# Patient Record
Sex: FEMALE | Race: OTHER | Employment: FULL TIME | URBAN - METROPOLITAN AREA
[De-identification: names, ages, dates, MRNs, and addresses within clinical notes are randomized per-mention and may not be internally consistent; named-entity substitution may affect disease eponyms.]

---

## 2023-01-06 ENCOUNTER — TELEPHONE (OUTPATIENT)
Dept: FAMILY MEDICINE CLINIC | Age: 28
End: 2023-01-06

## 2023-01-06 NOTE — TELEPHONE ENCOUNTER
Patient called stating that she needed a referral to be put in to the infusion center at [address] [place] [address]. She stated that she is currently going through a flare up and it is getting worse. The fax number to the facility is [phone]. Thanks!

## 2023-01-07 ENCOUNTER — HOSPITAL ENCOUNTER (EMERGENCY)
Age: 28
Discharge: HOME | End: 2023-01-07
Attending: EMERGENCY MEDICINE
Payer: COMMERCIAL

## 2023-01-07 ENCOUNTER — APPOINTMENT (OUTPATIENT)
Dept: CT IMAGING | Age: 28
End: 2023-01-07
Attending: NURSE PRACTITIONER
Payer: COMMERCIAL

## 2023-01-07 VITALS
HEIGHT: 63 IN | BODY MASS INDEX: 23.21 KG/M2 | RESPIRATION RATE: 16 BRPM | TEMPERATURE: 98.6 F | OXYGEN SATURATION: 98 % | HEART RATE: 75 BPM | WEIGHT: 131 LBS | SYSTOLIC BLOOD PRESSURE: 108 MMHG | DIASTOLIC BLOOD PRESSURE: 63 MMHG

## 2023-01-07 DIAGNOSIS — G35 MS (MULTIPLE SCLEROSIS) (HCC): Primary | ICD-10-CM

## 2023-01-07 LAB
ALBUMIN SERPL-MCNC: 4 G/DL (ref 3.5–5)
ALBUMIN/GLOB SERPL: 1.3 (ref 1.1–2.2)
ALP SERPL-CCNC: 64 U/L (ref 45–117)
ALT SERPL-CCNC: 21 U/L (ref 12–78)
ANION GAP SERPL CALC-SCNC: 6 MMOL/L (ref 5–15)
APPEARANCE UR: CLEAR
AST SERPL-CCNC: 13 U/L (ref 15–37)
BASOPHILS # BLD: 0 K/UL (ref 0–0.1)
BASOPHILS NFR BLD: 1 % (ref 0–1)
BILIRUB SERPL-MCNC: 0.3 MG/DL (ref 0.2–1)
BILIRUB UR QL: NEGATIVE
BUN SERPL-MCNC: 17 MG/DL (ref 6–20)
BUN/CREAT SERPL: 20 (ref 12–20)
CALCIUM SERPL-MCNC: 9.2 MG/DL (ref 8.5–10.1)
CHLORIDE SERPL-SCNC: 109 MMOL/L (ref 97–108)
CO2 SERPL-SCNC: 26 MMOL/L (ref 21–32)
COLOR UR: NORMAL
COMMENT, HOLDF: NORMAL
CREAT SERPL-MCNC: 0.86 MG/DL (ref 0.55–1.02)
DIFFERENTIAL METHOD BLD: NORMAL
EOSINOPHIL # BLD: 0.1 K/UL (ref 0–0.4)
EOSINOPHIL NFR BLD: 1 % (ref 0–7)
ERYTHROCYTE [DISTWIDTH] IN BLOOD BY AUTOMATED COUNT: 11.9 % (ref 11.5–14.5)
GLOBULIN SER CALC-MCNC: 3.2 G/DL (ref 2–4)
GLUCOSE SERPL-MCNC: 87 MG/DL (ref 65–100)
GLUCOSE UR STRIP.AUTO-MCNC: NEGATIVE MG/DL
HCG UR QL: NEGATIVE
HCT VFR BLD AUTO: 39.6 % (ref 35–47)
HGB BLD-MCNC: 13.8 G/DL (ref 11.5–16)
HGB UR QL STRIP: NEGATIVE
IMM GRANULOCYTES # BLD AUTO: 0 K/UL (ref 0–0.04)
IMM GRANULOCYTES NFR BLD AUTO: 0 % (ref 0–0.5)
KETONES UR QL STRIP.AUTO: NEGATIVE MG/DL
LEUKOCYTE ESTERASE UR QL STRIP.AUTO: NEGATIVE
LYMPHOCYTES # BLD: 1.6 K/UL (ref 0.8–3.5)
LYMPHOCYTES NFR BLD: 18 % (ref 12–49)
MCH RBC QN AUTO: 31.7 PG (ref 26–34)
MCHC RBC AUTO-ENTMCNC: 34.8 G/DL (ref 30–36.5)
MCV RBC AUTO: 90.8 FL (ref 80–99)
MONOCYTES # BLD: 0.6 K/UL (ref 0–1)
MONOCYTES NFR BLD: 7 % (ref 5–13)
NEUTS SEG # BLD: 6.5 K/UL (ref 1.8–8)
NEUTS SEG NFR BLD: 73 % (ref 32–75)
NITRITE UR QL STRIP.AUTO: NEGATIVE
NRBC # BLD: 0 K/UL (ref 0–0.01)
NRBC BLD-RTO: 0 PER 100 WBC
PH UR STRIP: 6 (ref 5–8)
PLATELET # BLD AUTO: 264 K/UL (ref 150–400)
PMV BLD AUTO: 11.1 FL (ref 8.9–12.9)
POTASSIUM SERPL-SCNC: 3.7 MMOL/L (ref 3.5–5.1)
PROT SERPL-MCNC: 7.2 G/DL (ref 6.4–8.2)
PROT UR STRIP-MCNC: NEGATIVE MG/DL
RBC # BLD AUTO: 4.36 M/UL (ref 3.8–5.2)
SAMPLES BEING HELD,HOLD: NORMAL
SODIUM SERPL-SCNC: 141 MMOL/L (ref 136–145)
SP GR UR REFRACTOMETRY: 1.02 (ref 1–1.03)
UR CULT HOLD, URHOLD: NORMAL
UROBILINOGEN UR QL STRIP.AUTO: 0.2 EU/DL (ref 0.2–1)
WBC # BLD AUTO: 8.9 K/UL (ref 3.6–11)

## 2023-01-07 PROCEDURE — 96374 THER/PROPH/DIAG INJ IV PUSH: CPT | Performed by: NURSE PRACTITIONER

## 2023-01-07 PROCEDURE — 74011000258 HC RX REV CODE- 258: Performed by: NURSE PRACTITIONER

## 2023-01-07 PROCEDURE — 81025 URINE PREGNANCY TEST: CPT

## 2023-01-07 PROCEDURE — 85025 COMPLETE CBC W/AUTO DIFF WBC: CPT

## 2023-01-07 PROCEDURE — 81003 URINALYSIS AUTO W/O SCOPE: CPT

## 2023-01-07 PROCEDURE — 80053 COMPREHEN METABOLIC PANEL: CPT

## 2023-01-07 PROCEDURE — 74011250636 HC RX REV CODE- 250/636: Performed by: NURSE PRACTITIONER

## 2023-01-07 PROCEDURE — 99284 EMERGENCY DEPT VISIT MOD MDM: CPT | Performed by: NURSE PRACTITIONER

## 2023-01-07 PROCEDURE — 36415 COLL VENOUS BLD VENIPUNCTURE: CPT

## 2023-01-07 RX ORDER — METHYLPREDNISOLONE 32 MG/1
1000 TABLET ORAL DAILY
Qty: 221 TABLET | Refills: 0 | Status: SHIPPED | OUTPATIENT
Start: 2023-01-07 | End: 2023-01-14

## 2023-01-07 RX ADMIN — METHYLPREDNISOLONE SODIUM SUCCINATE 1000 MG: 1 INJECTION, POWDER, LYOPHILIZED, FOR SOLUTION INTRAMUSCULAR; INTRAVENOUS at 15:01

## 2023-01-07 NOTE — ED PROVIDER NOTES
This is a 26-year-old female who presents to the emergency room with complaints of an MS flare. Patient was diagnosed with MS in 2012 in [facility].  Patient recently moved to the [facility] and has not seen her neurologist since she has been here. Also states she has been experiencing a headache on the left side along with heaviness in her left lower extremity and now to her left upper extremity. Also adds that she recently ran out of her prednisone and as soon as she stopped taking her medicine her symptoms started to increase. Denies any chest pain, shortness of breath, dizziness, nausea or vomiting, fevers or chills. No speech difficulty, no visual changes. States \"it feels like I do not have a left side. \"  There are no further complaints at this time    Shalini Ferrer MD  Past Medical History:  2012: MS (multiple sclerosis) ([facility]      Comment:  Has been following a neurologist in [facility] (Dr. Wyline Shone); takes kesimpta regularly and steroids                during a flare up  No past surgical history on file. Past Medical History:   Diagnosis Date    MS (multiple sclerosis) ([facility] 2012    Has been following a neurologist in [facility] (Dr. Wyline Shone); takes kesimpta regularly and steroids during a flare up       No past surgical history on file.       Family History:   Problem Relation Age of Onset    No Known Problems Mother     No Known Problems Father        Social History     Socioeconomic History    Marital status:      Spouse name: Not on file    Number of children: Not on file    Years of education: Not on file    Highest education level: Not on file   Occupational History    Not on file   Tobacco Use    Smoking status: Never     Passive exposure: Never    Smokeless tobacco: Never   Vaping Use    Vaping Use: Never used   Substance and Sexual Activity    Alcohol use: Not Currently    Drug use: Never    Sexual activity: Yes     Partners: Male     Comment:    Other Topics Concern    Not on file   Social History Narrative    Not on file     Social Determinants of Health     Financial Resource Strain: Low Risk     Difficulty of Paying Living Expenses: Not hard at all   Food Insecurity: No Food Insecurity    Worried About Running Out of Food in the Last Year: Never true    Ran Out of Food in the Last Year: Never true   Transportation Needs: Not on file   Physical Activity: Not on file   Stress: Not on file   Social Connections: Not on file   Intimate Partner Violence: Not on file   Housing Stability: Not on file         ALLERGIES: Patient has no known allergies. Review of Systems   Constitutional:  Negative for appetite change, chills, diaphoresis, fatigue and fever. HENT:  Negative for congestion, ear discharge, ear pain, sinus pressure, sinus pain, sore throat and trouble swallowing. Eyes:  Negative for photophobia, pain, redness and visual disturbance. Respiratory:  Negative for chest tightness, shortness of breath and wheezing. Cardiovascular:  Negative for chest pain and palpitations. Gastrointestinal:  Negative for abdominal distention, abdominal pain, nausea and vomiting. Endocrine: Negative. Genitourinary:  Negative for difficulty urinating, flank pain, frequency and urgency. Musculoskeletal:  Negative for back pain, neck pain and neck stiffness. Skin:  Negative for color change, pallor, rash and wound. Allergic/Immunologic: Negative. Neurological:  Positive for weakness and numbness. Negative for dizziness, speech difficulty and headaches. Hematological:  Does not bruise/bleed easily. Psychiatric/Behavioral:  Negative for behavioral problems. The patient is not nervous/anxious. Vitals:    01/07/23 1327   BP: 108/63   Pulse: 75   Resp: 16   Temp: 98.6 °F (37 °C)   SpO2: 98%   Weight: 59.4 kg (131 lb)   Height: 5' 3\" (1.6 m)            Physical Exam  Vitals and nursing note reviewed.    Constitutional:       General: She is not in acute distress. Appearance: Normal appearance. She is well-developed. She is not ill-appearing. HENT:      Head: Normocephalic and atraumatic. Right Ear: External ear normal.      Left Ear: External ear normal.      Nose: Nose normal. No congestion. Mouth/Throat:      Mouth: Mucous membranes are moist.   Eyes:      General:         Right eye: No discharge. Left eye: No discharge. Conjunctiva/sclera: Conjunctivae normal.      Pupils: Pupils are equal, round, and reactive to light. Neck:      Vascular: No JVD. Trachea: No tracheal deviation. Cardiovascular:      Rate and Rhythm: Normal rate and regular rhythm. Pulses: Normal pulses. Heart sounds: Normal heart sounds. No murmur heard. No gallop. Pulmonary:      Effort: Pulmonary effort is normal. No respiratory distress. Breath sounds: Normal breath sounds. Chest:      Chest wall: No tenderness. Abdominal:      General: Bowel sounds are normal. There is no distension. Palpations: Abdomen is soft. Tenderness: There is no abdominal tenderness. There is no guarding or rebound. Genitourinary:     Comments: Negative    Musculoskeletal:         General: No tenderness. Normal range of motion. Cervical back: Normal range of motion and neck supple. No tenderness. Skin:     General: Skin is warm and dry. Capillary Refill: Capillary refill takes less than 2 seconds. Coloration: Skin is not pale. Findings: No erythema or rash. Neurological:      Mental Status: She is alert and oriented to person, place, and time. Motor: Weakness present. Coordination: Coordination normal.   Psychiatric:         Mood and Affect: Mood normal.         Behavior: Behavior normal.         Thought Content: Thought content normal.         Judgment: Judgment normal.        Medical Decision Making  Amount and/or Complexity of Data Reviewed  Labs: ordered.   Radiology: ordered. Risk  Prescription drug management. Labs Reviewed   METABOLIC PANEL, COMPREHENSIVE - Abnormal; Notable for the following components:       Result Value    Chloride 109 (*)     AST (SGOT) 13 (*)     All other components within normal limits   URINE CULTURE HOLD SAMPLE   CBC WITH AUTOMATED DIFF   URINALYSIS W/ RFLX MICROSCOPIC   SAMPLES BEING HELD   HCG URINE, QL. - POC       No results found. 4:22 PM  Pt has been reexamined. Offer admission to the hospital of which she declined. Spoke with pharmacy regarding outpatient dosing and verified 1000 mg methylprednisolone po daily times 7 days. Pt has no new complaints, changes or physical findings. Care plan outlined and precautions discussed. All available results were reviewed with pt. All medications were reviewed with pt. All of pt's questions and concerns were addressed. Pt agrees to F/U as instructed and agrees to return to ED upon further deterioration. Pt is ready to go home. Yony Dempsey NP    Please note that this dictation was completed with Wanderio, the computer voice recognition software. Quite often unanticipated grammatical, syntax, homophones, and other interpretive errors are inadvertently transcribed by the computer software. Please disregard these errors. Please excuse any errors that have escaped final proofreading. Thank you.     Procedures these errors. Please excuse any errors that have escaped final proofreading. Thank you.     Procedures

## 2023-01-07 NOTE — ED TRIAGE NOTES
Pt presents to the ED with c/o MS relapse. Pt states that she has been experiencing a headache and heaviness on the L side, specifically her L arm. Pt also reports tingling in the L leg and weakness. Pt states that she recently ran out of her Prednisone and that's when her symptoms started to increase. Pt was dx with MS in 2012.  Pt states that she has not seen a neurologist in a while due to her recently moving from [facility].

## 2023-01-24 ENCOUNTER — OFFICE VISIT (OUTPATIENT)
Dept: FAMILY MEDICINE CLINIC | Age: 28
End: 2023-01-24
Payer: COMMERCIAL

## 2023-01-24 VITALS
OXYGEN SATURATION: 99 % | BODY MASS INDEX: 23.4 KG/M2 | SYSTOLIC BLOOD PRESSURE: 94 MMHG | HEART RATE: 74 BPM | DIASTOLIC BLOOD PRESSURE: 54 MMHG | WEIGHT: 132.13 LBS

## 2023-01-24 DIAGNOSIS — Z12.4 SCREENING FOR CERVICAL CANCER: Primary | ICD-10-CM

## 2023-01-24 RX ORDER — PREDNISONE 20 MG/1
TABLET ORAL
COMMUNITY

## 2023-01-24 NOTE — PROGRESS NOTES
[address] [place] [address]   Office [phone], Fax [phone]      Chief Complaint:     Chief Complaint   Patient presents with    Well Woman     pap       Stanford Taylor is a 32 y.o. female that presents for: Pap      Subjective:   HPI:  Stanford Taylor is a 32 y.o. female that presents for:    Worried about getting PAP smear done as it is patient's first time. Patient recently got , and wanted to get a pap smear done. She has a diagnosis of MS from the age of 16. Has a neurologist appointment coming up in March. Currently has no concerns. Past medical history, social history, medications, and allergies personally reviewed. Past Medical History:   Diagnosis Date    MS (multiple sclerosis) ([facility] 2012    Has been following a neurologist in [facility] (Dr. Lara Alcantar); takes kesimpta regularly and steroids during a flare up        Social Hx:   Alcohol: No  Tobacco: No  Illicit drug use: No     Medications:   Current Outpatient Medications   Medication Sig    ofatumumab (KESIMPTA PEN SC) by SubCUTAneous route. predniSONE (DELTASONE) 20 mg tablet Take  by mouth daily (with breakfast). Start 3pills a day for 3 days,2nd day 2 pills for 2 days,1 pill for 1 day     No current facility-administered medications for this visit. Allergies:  No Known Allergies     Health Maintenance:  Health Maintenance Due   Topic Date Due    Hepatitis C Screening  Never done    COVID-19 Vaccine (1) Never done    DTaP/Tdap/Td series (1 - Tdap) Never done    Pap Smear  Never done    Flu Vaccine (1) Never done        Objective:   Vitals reviewed. Visit Vitals  BP (!) 94/54 (BP 1 Location: Right arm, BP Patient Position: Sitting, BP Cuff Size: Adult)   Pulse 74   Wt 132 lb 2 oz (59.9 kg)   LMP 01/02/2023   SpO2 99%   BMI 23.40 kg/m²        Physical Exam:  General Alert. No distress. Not diaphoretic. Cardio Normal rate, regular rhythm.     Pulmonary Effort normal. Breath sounds normal. No respiratory distress. No wheezes, rales, or rhonchi. No Stridor. Extremities No edema of lower extremities. No tenderness. Neurological No focal deficits. Skin Skin is warm and dry. No rash noted. No erythema. Psychiatric Mood, affect, and judgment normal.        Assessment/Plan:   Nettie Jameson is a 32 y.o. with a PMHx of MS who presents for pap smear    1. Screening for cervical cancer: Pap collected. -     PAP IG, APTIMA HPV AND RFX 16/18,45 ([id number]); Future   - Follow up in 1 year for annual physical    2. Multiple Sclerosis: Diagnosed at the age of 16. Used to have a neurologist in [facility]. Will establish with a neurologist, Dr. Rosemary Bella in March. - on Ofatumumab injections once monthly   - Prednisone for flares   - Follow up with Dr. Rosemary Bella in 2 months (neurology)      Follow up:   Return in about 1 year (around 1/24/2024) for Annual physical.    Pt was discussed with Dr Kannan Bonner (attending physician). I have reviewed pertinent labs results and other data. I have discussed the diagnosis with the patient and the intended plan as seen in the above orders. The patient has received an after-visit summary and questions were answered concerning future plans. I have discussed medication side effects and warnings with the patient as well.     Stewart Wolf MD  Family Medicine Resident PGY-1

## 2023-01-24 NOTE — PROGRESS NOTES
Chief Complaint   Patient presents with    Well Woman     pap     Visit Vitals  BP (!) 94/54 (BP 1 Location: Right arm, BP Patient Position: Sitting, BP Cuff Size: Adult)   Pulse 74   Wt 132 lb 2 oz (59.9 kg)   SpO2 99%   BMI 23.40 kg/m²

## 2023-01-28 LAB
CYTOLOGIST CVX/VAG CYTO: NORMAL
CYTOLOGY CVX/VAG DOC CYTO: NORMAL
CYTOLOGY CVX/VAG DOC THIN PREP: NORMAL
DX ICD CODE: NORMAL
HPV GENOTYPE REFLEX: NORMAL
HPV I/H RISK 4 DNA CVX QL PROBE+SIG AMP: NEGATIVE
Lab: NORMAL
OTHER STN SPEC: NORMAL
STAT OF ADQ CVX/VAG CYTO-IMP: NORMAL

## 2023-03-23 ENCOUNTER — TELEPHONE (OUTPATIENT)
Dept: NEUROLOGY | Age: 28
End: 2023-03-23

## 2023-03-23 NOTE — TELEPHONE ENCOUNTER
Pt came into office at 2:15pm to be checked in for 2pm appt with [place]. I told pt her appt was at 2pm and she said yea? Then I said it's 2:17pm. She responded with yea? I told her we have a late policy and we have to reschedule. Pt told me she was here at 2:08pm and I knew she was lying because she had just walked in the door.  Pt had no paperwork so I gave it to her and gave her appt for tomorrow at 11:30am. Told her to be here at 11am. Pt was irritated, but said she understands

## 2023-03-24 ENCOUNTER — OFFICE VISIT (OUTPATIENT)
Dept: NEUROLOGY | Age: 28
End: 2023-03-24

## 2023-03-24 VITALS
WEIGHT: 134 LBS | HEIGHT: 63 IN | BODY MASS INDEX: 23.74 KG/M2 | RESPIRATION RATE: 14 BRPM | SYSTOLIC BLOOD PRESSURE: 115 MMHG | HEART RATE: 87 BPM | OXYGEN SATURATION: 100 % | DIASTOLIC BLOOD PRESSURE: 67 MMHG

## 2023-03-24 DIAGNOSIS — G35 MS (MULTIPLE SCLEROSIS) (HCC): Primary | ICD-10-CM

## 2023-03-24 NOTE — PROGRESS NOTES
[facility] Neurology Clinics and [address] at [facility] Neurology Clinics at [facility] [address], [address] [facility] [address] [facility] [address]  [phone] Office  [phone]           Referring: Nhung Mendoza, [address],  Lorelei Ryan 33     Chief Complaint   Patient presents with    New Patient    Multiple Sclerosis     Dx'd 2012 in [facility].  Last MRIs were about 1 yr ago, previous tx Mayzent, Gilenya, Copaxone, currently on Sidumula 30     26-year-old lady presents for neurologic consultation regarding multiple sclerosis. She is accompanied by her . She relates having symptoms of left-sided numbness that would increase and not go away and she went to the hospital in 2012 with the symptoms where she underwent work-up including multiple MRIs lumbar puncture etc. and was diagnosed with multiple sclerosis. She was started on Copaxone and was on that for 2 years but was changed after she had multiple relapses. She went on Gilenya and was on that for 4 years and came off when she moved to the [address] [place] [address]. She was on Mayzent for 2 years noting frequent relapses and she has been on Kcentra most recently. She notes that she has had 2 or 3 relapses and she describes all of these as really left-sided numbness with some headache. She was following with her neurologist in [facility] and she last saw him this past February and MRIs were planned to be done but she was in [facility] for her wedding and the next day was her wedding so she did not get those done and then she came back to the [facility]. She continues to have left-sided numbness. She and her  would like to have a baby in the next 3 years or so. She does believe her symptoms of left-sided numbness are getting a bit worse and she believes she has some decreased dexterity of the left hand.     Office visit with Dr. Dia Lino dated January 24, 2023 where patient came in for routine Pap smear. She was said to have been diagnosed at the age of 16 with multiple sclerosis and was followed by neurology in [place].  She was on key symptom and prednisone for flares. This of MRI ordered by Dr. Wes Bolden dated February 24, 2022 with reported out innumerable 40-50 lesions in confluency. No new lesions. And this was said to be stable to improved versus MRI November 4, 2021. There was a T2 hyperintense lesion partially visualized in the cervical cord    MRI cervical spine November 15, 2021 with subtle T2 and STIR hyperintense lesion at the level of the foramen magnum 4 mm with some enhancement. Office visit March 3, 2022  Patient was said to have been diagnosed with relapsing remitting MS since 2012 . She was tolerating that. She was continued on keisempta    Laboratory analysis February 1, 1757 metabolic panel unremarkable  Immunoglobin's unremarkable  RPR nonreactive  hCG negative  Hepatitis B surface antigen nonreactive  Vitamin D 49  B12 and folate normal  HIV nonreactive  Hepatitis C nonreactive  Hepatitis core antibody nonreactive  Varicella-zoster IgG positive at 3  Hepatitis B surface antibody nonreactive    Office visit February 9, 2022 where she was on [place] but had not injected since December. They considered switching to [facility] visit December 21, 2021 where patient had symptoms of relapse earlier in the month and had 3 days of Solu-Medrol. No examination    Office note from December 16, 2021 where she was noted to have had a relapse in October Solu-Medrol 1500 mg symptoms did not resolve completely. She was complaining of paresthesia and myalgia of her left extremities. Examination found less sensation on the left side of her face versus the right otherwise unremarkable.   Past Medical History:   Diagnosis Date    MS (multiple sclerosis) ([facility] 2012    Has been following a neurologist in [place] ( Napoleon Mckeon); takes kesimpta regularly and steroids during a flare up       History reviewed. No pertinent surgical history. Current Outpatient Medications   Medication Sig Dispense Refill    ofatumumab 20 mg/0.4 mL pnij by SubCUTAneous route. predniSONE (DELTASONE) 20 mg tablet Take  by mouth daily (with breakfast). Start 3pills a day for 3 days,2nd day 2 pills for 2 days,1 pill for 1 day          No Known Allergies    Social History     Tobacco Use    Smoking status: Never     Passive exposure: Never    Smokeless tobacco: Never   Vaping Use    Vaping Use: Never used   Substance Use Topics    Alcohol use: Not Currently    Drug use: Never       Family History   Problem Relation Age of Onset    No Known Problems Mother     No Known Problems Father        Review of Systems  Pertinent positives and negatives as noted. Examination  Visit Vitals  Ht 5' 3\" (1.6 m)   Wt 60.8 kg (134 lb)   BMI 23.74 kg/m²     She is a pleasant lady. She is awake alert and oriented. She has normal speech and normal language. Cognition is normal.  Cranial nerves intact 2-12. No afferent pupillary defect. No pronation or drift. She has postural tremor of the outstretched hands. She resists fully in all muscle groups in the upper and lower extremities bilaterally. Reflexes more brisk right upper and lower versus left. No ataxia. Steady gait.     Impression/Plan  Multiple sclerosis as above  We discussed that the recurrent left-sided numbness would be a bit odd to describe as a relapse meaning the exact same symptoms returning and this is more likely just recruitments of her known deficit  She and her  have wished to start a family in the next few years and it would make sense to treat with [address] [place] [address] committing to 2 years of treatment and then she would be free to plan her family as she wishes  Restage disease with MRIs of the brain cervical and thoracic spine  Return after    Lorin Jeffery MD          This note was created using voice recognition software. Despite editing, there may be syntax errors.

## 2023-04-24 ENCOUNTER — HOSPITAL ENCOUNTER (OUTPATIENT)
Dept: MRI IMAGING | Age: 28
Discharge: HOME | End: 2023-04-24
Attending: PSYCHIATRY & NEUROLOGY
Payer: COMMERCIAL

## 2023-04-24 DIAGNOSIS — G35 MS (MULTIPLE SCLEROSIS) (HCC): ICD-10-CM

## 2023-04-24 PROCEDURE — 74011250636 HC RX REV CODE- 250/636: Performed by: PSYCHIATRY & NEUROLOGY

## 2023-04-24 PROCEDURE — 72156 MRI NECK SPINE W/O & W/DYE: CPT

## 2023-04-24 PROCEDURE — 72157 MRI CHEST SPINE W/O & W/DYE: CPT

## 2023-04-24 PROCEDURE — 70553 MRI BRAIN STEM W/O & W/DYE: CPT

## 2023-04-24 PROCEDURE — A9576 INJ PROHANCE MULTIPACK: HCPCS | Performed by: PSYCHIATRY & NEUROLOGY

## 2023-04-24 RX ADMIN — GADOTERIDOL 13 ML: 279.3 INJECTION, SOLUTION INTRAVENOUS at 20:21

## 2023-04-27 ENCOUNTER — OFFICE VISIT (OUTPATIENT)
Dept: NEUROLOGY | Age: 28
End: 2023-04-27
Payer: COMMERCIAL

## 2023-04-27 VITALS
SYSTOLIC BLOOD PRESSURE: 124 MMHG | HEART RATE: 78 BPM | RESPIRATION RATE: 20 BRPM | WEIGHT: 134 LBS | OXYGEN SATURATION: 100 % | DIASTOLIC BLOOD PRESSURE: 63 MMHG | HEIGHT: 63 IN | BODY MASS INDEX: 23.74 KG/M2

## 2023-04-27 DIAGNOSIS — G35 MS (MULTIPLE SCLEROSIS) (HCC): Primary | ICD-10-CM

## 2023-04-27 DIAGNOSIS — Z51.81 MEDICATION MONITORING ENCOUNTER: ICD-10-CM

## 2023-04-27 PROCEDURE — 99215 OFFICE O/P EST HI 40 MIN: CPT | Performed by: PSYCHIATRY & NEUROLOGY

## 2023-04-27 NOTE — PROGRESS NOTES
Visit Vitals  /63 (BP 1 Location: Left upper arm, BP Patient Position: Sitting, BP Cuff Size: Adult)   Pulse 78   Resp 20   Ht 5' 3\" (1.6 m)   Wt 134 lb (60.8 kg)   SpO2 100%   BMI 23.74 kg/m²      Chief Complaint   Patient presents with    Results     Patient is here for MRI results

## 2023-04-27 NOTE — PROGRESS NOTES
[facility] Neurology Clinics and [address] at [facility] Neurology Clinics at [address] [facility], [address] [place] [address] [address] [facility], [address] [place] [address]   [phone]              Chief Complaint   Patient presents with    Results     Patient is here for MRI results      Current Outpatient Medications   Medication Sig Dispense Refill    ofatumumab 20 mg/0.4 mL pnij by SubCUTAneous route. predniSONE (DELTASONE) 20 mg tablet Take  by mouth daily (with breakfast). Start 3pills a day for 3 days,2nd day 2 pills for 2 days,1 pill for 1 day        No Known Allergies  Social History     Tobacco Use    Smoking status: Never     Passive exposure: Never    Smokeless tobacco: Never   Vaping Use    Vaping Use: Never used   Substance Use Topics    Alcohol use: Not Currently    Drug use: Never     26-year-old lady returns today for follow-up. I saw her about a month ago for neurologic consultation regarding MS. Her symptoms of left-sided numbness started in 2012. Previous DMT  Copaxone-discontinued due to multiple relapses  Gilenya-discontinued when she moved to the [facility] x2 years-frequent relapses  Ayesha Levi most recently    MRI of the brain March 24, 2023 with moderate burden. No enhancing lesions  MRI of the cervical spine same date lesion in the cervical medullary junction  MRI of the thoracic cord same date no lesions    Films personally reviewed                                      Labs done in [facility] dated February 12, 2021  Comprehensive metabolic profile normal  Vitamin B12 normal  Hepatitis B surface antibody nonreactive  Hepatitis surface antigen nonreactive  Hepatitis C antibody nonreactive  Immunoglobin's normal  The urine culture unremarkable  TSH normal    She has no recent exacerbation. With her baseline numbness and tingling of the left side.       Examination  Visit Vitals  /63 (BP 1 Location: Left upper arm, BP Patient Position: Sitting, BP Cuff Size: Adult)   Pulse 78   Resp 20   Ht 5' 3\" (1.6 m)   Wt 60.8 kg (134 lb)   SpO2 100%   BMI 23.74 kg/m²     She is awake alert and oriented. Speech and language normal.  Cognition normal.  No ataxia. Impression/Plan  Multiple sclerosis  [facility] disease modifying therapy  Follow-up after her first treatment course    Ravin Gray MD        This note was created using voice recognition software. Despite editing, there may be syntax errors.

## 2023-05-04 LAB
GAMMA INTERFERON BACKGROUND BLD IA-ACNC: 0.15 IU/ML
M TB IFN-G BLD-IMP: NEGATIVE
M TB IFN-G CD4+ T-CELLS BLD-ACNC: 0.14 IU/ML
M TBIFN-G CD4+ CD8+T-CELLS BLD-ACNC: 0.22 IU/ML
MITOGEN IGNF BLD-ACNC: >10 IU/ML
SERVICE CMNT-IMP: NORMAL

## 2023-07-10 ENCOUNTER — TELEPHONE (OUTPATIENT)
Age: 28
End: 2023-07-10

## 2023-07-10 NOTE — TELEPHONE ENCOUNTER
Pt requesting call back to discuss medications and referral to [id number] [place] [address] [place] specialist. Please contact pt.

## 2023-08-24 ENCOUNTER — OFFICE VISIT (OUTPATIENT)
Age: 28
End: 2023-08-24
Payer: COMMERCIAL

## 2023-08-24 VITALS
DIASTOLIC BLOOD PRESSURE: 60 MMHG | HEART RATE: 64 BPM | RESPIRATION RATE: 14 BRPM | OXYGEN SATURATION: 96 % | SYSTOLIC BLOOD PRESSURE: 111 MMHG

## 2023-08-24 DIAGNOSIS — G35 MS (MULTIPLE SCLEROSIS) (HCC): Primary | ICD-10-CM

## 2023-08-24 PROCEDURE — 99214 OFFICE O/P EST MOD 30 MIN: CPT | Performed by: PSYCHIATRY & NEUROLOGY

## 2023-08-24 RX ORDER — CLADRIBINE 10 MG/1
TABLET ORAL
Qty: 6 EACH | Refills: 0 | Status: SHIPPED | OUTPATIENT
Start: 2023-08-24 | End: 2023-08-29

## 2023-08-24 RX ORDER — CLADRIBINE 10 MG/1
TABLET ORAL
Qty: 6 EACH | Refills: 0 | Status: SHIPPED | OUTPATIENT
Start: 2023-09-14 | End: 2023-09-19

## 2023-08-24 NOTE — PROGRESS NOTES
[facility] Neurology Clinics and [address] at [facility] Neurology Clinics at [address] [place] [address], [address] [facility]   [phone]              Chief Complaint   Patient presents with    Multiple Sclerosis     Discuss starting [place]     Current Outpatient Medications   Medication Sig Dispense Refill    Ofatumumab 20 MG/0.4ML SOAJ Inject 20 mg into the skin every 28 days      predniSONE (DELTASONE) 20 MG tablet Take by mouth daily (with breakfast) (Patient not taking: Reported on 8/24/2023)       No current facility-administered medications for this visit. No Known Allergies  Social History     Tobacco Use    Smoking status: Never    Smokeless tobacco: Never   Substance Use Topics    Alcohol use: Not Currently    Drug use: Never     70-year-old lady following up for multiple sclerosis. Last visit we were going to start [address] . She had her laboratory analysis performed. We were unable to reach her by phone afterwards to get the process started. No exacerbations. Previous DMT  Copaxone-discontinued due to multiple relapses  Gilenya-discontinued when she moved to the [address] [place] [address] x2 years-frequent relapses  Abhinav Castaneda most recently    Most recent Imaging  MRI of the brain March 24, 2023 with moderate burden. No enhancing lesions  MRI of the cervical spine same date lesion in the cervical medullary junction  MRI of the thoracic cord same date no lesions    Laboratory analysis April 2023  Hepatitis B surface antigen nonreactive  HIV nonreactive  Hepatitis C antibody nonreactive  TSH normal  Free T4 normal  CBC with a white count of 47.5  Metabolic panel unremarkable  hCG negative  Immunoglobin levels with immunoglobin a slightly low at 64  Varicella IgG immune at 1062  QuantiFERON negative    Examination  /60   Pulse 64   Resp 14   SpO2 96%   Awake alert and oriented.   Speech and

## 2023-08-24 NOTE — TELEPHONE ENCOUNTER
Start form for [facility] completed and faxed to Fanny Anderson.   Sent year 1 cycles 1-2 to [facility] to get PA done

## 2023-09-08 ENCOUNTER — TELEPHONE (OUTPATIENT)
Age: 28
End: 2023-09-08

## 2023-09-08 NOTE — TELEPHONE ENCOUNTER
Previous DMT  Copaxone-discontinued due to multiple relapses  Gilenya-discontinued when she moved to the [address] [place] [address] x2 years-frequent relapses  Toby Duarte most recently

## 2023-09-08 NOTE — TELEPHONE ENCOUNTER
Re: Blanca HUSSEIN request kris fax from [facility], see med PA was already submitted by [facility]. Per MS[employer] \"Secondary denied, patient needs to try/fail Tecfidera. Emailed office to see if they want to appeal or try alternate therapy. \" Sent update to nurse.

## 2023-09-09 NOTE — TELEPHONE ENCOUNTER
Re: John Davis note back to appeal, sent e-mail to Lashonda Saeed with [facility] for appeal submission and reason. Awaiting update.

## 2023-11-08 ENCOUNTER — TELEPHONE (OUTPATIENT)
Age: 28
End: 2023-11-08

## 2023-11-08 NOTE — TELEPHONE ENCOUNTER
Lino Escamilla is calling to follow up on the appeal letter for [facility] due to them not getting a response. They would like to know should they shut it down?

## 2023-11-08 NOTE — TELEPHONE ENCOUNTER
Re: Man Axon and see appeal was sent on 10/10/23. Normally appeals can take up to 30 business days. Faxed update to [employer].

## 2023-11-22 ENCOUNTER — TELEPHONE (OUTPATIENT)
Age: 28
End: 2023-11-22

## 2024-03-05 ENCOUNTER — TELEPHONE (OUTPATIENT)
Age: 29
End: 2024-03-05

## 2024-03-05 NOTE — TELEPHONE ENCOUNTER
LVM with pt stating [employer] reached out to office asking if pt was able to start Mavenclad after insurance appeal had been approved.  They had been attempting to reach pt since Jan without success.

## 2024-03-27 ENCOUNTER — TELEPHONE (OUTPATIENT)
Age: 29
End: 2024-03-27

## 2024-03-27 DIAGNOSIS — G35 MULTIPLE SCLEROSIS (HCC): Primary | ICD-10-CM

## 2024-03-27 NOTE — TELEPHONE ENCOUNTER
[facility] reached out and wanted to inform Dr. Martin the patients insurance does not cover medication Mavenclad.    Ref# [id number]    Alternatives    Glatopa    Dimethyl Fumarate    Fingolimod    Teriflunomide

## 2024-03-28 NOTE — TELEPHONE ENCOUNTER
Re: Mavenclad    This is a [facility] med, based on msot on script med is approved but its been discontinued. If new PA is needed please escribe out new script so [facility] can process as needed. Sent update to nurse.

## 2024-05-13 ENCOUNTER — TELEPHONE (OUTPATIENT)
Age: 29
End: 2024-05-13

## 2024-05-14 NOTE — TELEPHONE ENCOUNTER
LVM with [place] and pt.  During LOV, pt was to d/c the Kesimpta and start the Mavenclad.  Pt was approved to start Mavenclad 10/16/23 - 10/16/24.  Pt had not started d/t insurance changes.  Mychart msg sent to pt she has upcoming appt with Irina on 7/18/24  
Patient is returning missed call. States she did hear VM but would still like to discuss since there was a lot of waiting since she had to go through denials and appeals for new insurance. States she has a couple of questions to discuss with nurse.    Please contact.  
[place] calling about patient stopped taking Kesimpta on 4/11/24 when can patient start Mavenclad.      
Detail Level: Detailed

## 2024-07-18 ENCOUNTER — TELEMEDICINE (OUTPATIENT)
Age: 29
End: 2024-07-18
Payer: COMMERCIAL

## 2024-07-18 DIAGNOSIS — G35 RELAPSING REMITTING MULTIPLE SCLEROSIS (HCC): ICD-10-CM

## 2024-07-18 DIAGNOSIS — G35 RELAPSING REMITTING MULTIPLE SCLEROSIS (HCC): Primary | ICD-10-CM

## 2024-07-18 DIAGNOSIS — G35 MULTIPLE SCLEROSIS EXACERBATION (HCC): ICD-10-CM

## 2024-07-18 PROCEDURE — 99214 OFFICE O/P EST MOD 30 MIN: CPT | Performed by: NURSE PRACTITIONER

## 2024-07-18 NOTE — PROGRESS NOTES
[facility]  [place] [facility]  NEUROLOGY CLINIC   [address]   [place] [address]   [phone] Office   [phone] Fax           Date:  24     Name:  WU SQUIRES  :  1995  MRN:  [id number]     PCP:  Ana Maria Cooney MD    Wu Squires is a 28 y.o. female who was seen by synchronous (real-time) audio-video technology on 2024 for Multiple Sclerosis    Subjective:   Diagnosed in . Her presenting symptoms were numbness and loss of coordination in the left side. She remembers having a severe migraine and the arm started to go numb. She was hospitalized that December and over the course of the next nine days. She was started with DMT Copaxone. Due to a relapse and progression and lack of compliance, she was started on three days a week Copaxone but still did not respond well to this. She started having left facial drop. She was switched to Gilenya and she did well for about four years. She was then switched to Kesimpta. While on Kesimpta, she has done well but due to family planning, she wanted to switch to something that would be pregnancy safe. She was to start Mavenclad but due to an insurance change, she was never able to get the medication. As it was taking some time to get this approved through her new insurance, she went back to the [facility]. Her last dose of this was in April and she did receive the Mavenclad but has not started this yet. As she has been off of DMT, she has started to have an exacerbation. It started off as numbness in her ear then extends into the left leg and left arm. She will feel weak and tired. Baseline, she has some persistent tingling and it will feel heavy at times as well.     Previous DMT  Copaxone-discontinued due to multiple relapses  Gilenya-discontinued when she moved to the   [place] x2 years-frequent relapses  Keisempta most recently     Most recent Imaging  MRI of the

## 2024-07-25 LAB
ALBUMIN SERPL-MCNC: 4.4 G/DL (ref 4–5)
ALP SERPL-CCNC: 56 IU/L (ref 44–121)
ALT SERPL-CCNC: 7 IU/L (ref 0–32)
AST SERPL-CCNC: 11 IU/L (ref 0–40)
BASOPHILS # BLD AUTO: 0 X10E3/UL (ref 0–0.2)
BASOPHILS NFR BLD AUTO: 0 %
BILIRUB SERPL-MCNC: 0.4 MG/DL (ref 0–1.2)
BUN SERPL-MCNC: 13 MG/DL (ref 6–20)
BUN/CREAT SERPL: 16 (ref 9–23)
CALCIUM SERPL-MCNC: 9.3 MG/DL (ref 8.7–10.2)
CHLORIDE SERPL-SCNC: 105 MMOL/L (ref 96–106)
CO2 SERPL-SCNC: 23 MMOL/L (ref 20–29)
CREAT SERPL-MCNC: 0.82 MG/DL (ref 0.57–1)
EGFRCR SERPLBLD CKD-EPI 2021: 100 ML/MIN/1.73
EOSINOPHIL # BLD AUTO: 0 X10E3/UL (ref 0–0.4)
EOSINOPHIL NFR BLD AUTO: 0 %
ERYTHROCYTE [DISTWIDTH] IN BLOOD BY AUTOMATED COUNT: 12.5 % (ref 11.7–15.4)
GLOBULIN SER CALC-MCNC: 1.9 G/DL (ref 1.5–4.5)
GLUCOSE SERPL-MCNC: 80 MG/DL (ref 70–99)
HBV SURFACE AB SER QL: NON REACTIVE
HCT VFR BLD AUTO: 38.4 % (ref 34–46.6)
HCV IGG SERPL QL IA: NON REACTIVE
HGB BLD-MCNC: 13 G/DL (ref 11.1–15.9)
HIV 1+2 AB+HIV1 P24 AG SERPL QL IA: NON REACTIVE
IMM GRANULOCYTES # BLD AUTO: 0.1 X10E3/UL (ref 0–0.1)
IMM GRANULOCYTES NFR BLD AUTO: 1 %
LYMPHOCYTES # BLD AUTO: 1.4 X10E3/UL (ref 0.7–3.1)
LYMPHOCYTES NFR BLD AUTO: 10 %
MCH RBC QN AUTO: 30.6 PG (ref 26.6–33)
MCHC RBC AUTO-ENTMCNC: 33.9 G/DL (ref 31.5–35.7)
MCV RBC AUTO: 90 FL (ref 79–97)
MONOCYTES # BLD AUTO: 0.8 X10E3/UL (ref 0.1–0.9)
MONOCYTES NFR BLD AUTO: 6 %
NEUTROPHILS # BLD AUTO: 11.3 X10E3/UL (ref 1.4–7)
NEUTROPHILS NFR BLD AUTO: 83 %
PLATELET # BLD AUTO: 241 X10E3/UL (ref 150–450)
POTASSIUM SERPL-SCNC: 4.3 MMOL/L (ref 3.5–5.2)
PROT SERPL-MCNC: 6.3 G/DL (ref 6–8.5)
RBC # BLD AUTO: 4.25 X10E6/UL (ref 3.77–5.28)
SODIUM SERPL-SCNC: 141 MMOL/L (ref 134–144)
T4 FREE SERPL-MCNC: 1.3 NG/DL (ref 0.82–1.77)
TSH SERPL DL<=0.005 MIU/L-ACNC: 0.34 UIU/ML (ref 0.45–4.5)
VZV IGG SER IA-ACNC: 924 INDEX
WBC # BLD AUTO: 13.6 X10E3/UL (ref 3.4–10.8)

## 2024-07-25 RX ORDER — SODIUM CHLORIDE 9 MG/ML
5-250 INJECTION, SOLUTION INTRAVENOUS PRN
Status: CANCELLED | OUTPATIENT
Start: 2024-07-30

## 2024-07-27 LAB
IGA SERPL-MCNC: 62 MG/DL (ref 87–352)
IGE SERPL-ACNC: 2 IU/ML (ref 6–495)
IGG SERPL-MCNC: 698 MG/DL (ref 586–1602)
IGM SERPL-MCNC: 58 MG/DL (ref 26–217)

## 2024-07-30 ENCOUNTER — HOSPITAL ENCOUNTER (OUTPATIENT)
Facility: HOSPITAL | Age: 29
Setting detail: INFUSION SERIES
Discharge: HOME | End: 2024-07-30
Payer: COMMERCIAL

## 2024-07-30 VITALS
RESPIRATION RATE: 18 BRPM | TEMPERATURE: 98.6 F | OXYGEN SATURATION: 100 % | HEART RATE: 75 BPM | DIASTOLIC BLOOD PRESSURE: 63 MMHG | SYSTOLIC BLOOD PRESSURE: 96 MMHG

## 2024-07-30 DIAGNOSIS — G35 MS (MULTIPLE SCLEROSIS) (HCC): Primary | ICD-10-CM

## 2024-07-30 PROCEDURE — 6360000002 HC RX W HCPCS: Performed by: NURSE PRACTITIONER

## 2024-07-30 PROCEDURE — 2580000003 HC RX 258: Performed by: NURSE PRACTITIONER

## 2024-07-30 PROCEDURE — 96365 THER/PROPH/DIAG IV INF INIT: CPT

## 2024-07-30 RX ORDER — SODIUM CHLORIDE 0.9 % (FLUSH) 0.9 %
5-40 SYRINGE (ML) INJECTION PRN
Status: CANCELLED | OUTPATIENT
Start: 2024-07-31

## 2024-07-30 RX ORDER — SODIUM CHLORIDE 9 MG/ML
5-250 INJECTION, SOLUTION INTRAVENOUS PRN
Status: CANCELLED | OUTPATIENT
Start: 2024-07-31

## 2024-07-30 RX ORDER — SODIUM CHLORIDE 9 MG/ML
5-250 INJECTION, SOLUTION INTRAVENOUS PRN
Status: DISCONTINUED | OUTPATIENT
Start: 2024-07-30 | End: 2024-07-31 | Stop reason: HOSPADM

## 2024-07-30 RX ORDER — SODIUM CHLORIDE 0.9 % (FLUSH) 0.9 %
5-40 SYRINGE (ML) INJECTION PRN
Status: DISCONTINUED | OUTPATIENT
Start: 2024-07-30 | End: 2024-07-31 | Stop reason: HOSPADM

## 2024-07-30 RX ADMIN — SODIUM CHLORIDE 75 ML/HR: 9 INJECTION, SOLUTION INTRAVENOUS at 11:30

## 2024-07-30 RX ADMIN — SODIUM CHLORIDE 1000 MG: 900 INJECTION INTRAVENOUS at 11:45

## 2024-07-30 RX ADMIN — Medication 10 ML: at 11:11

## 2024-07-30 ASSESSMENT — PAIN DESCRIPTION - DESCRIPTORS: DESCRIPTORS: HEAVINESS

## 2024-07-30 ASSESSMENT — PAIN - FUNCTIONAL ASSESSMENT: PAIN_FUNCTIONAL_ASSESSMENT: ACTIVITIES ARE NOT PREVENTED

## 2024-07-30 ASSESSMENT — PAIN DESCRIPTION - PAIN TYPE: TYPE: CHRONIC PAIN

## 2024-07-30 ASSESSMENT — PAIN SCALES - GENERAL: PAINLEVEL_OUTOF10: 5

## 2024-07-30 ASSESSMENT — PAIN DESCRIPTION - ORIENTATION: ORIENTATION: LEFT

## 2024-07-30 ASSESSMENT — PAIN DESCRIPTION - ONSET: ONSET: ON-GOING

## 2024-07-30 ASSESSMENT — PAIN DESCRIPTION - LOCATION: LOCATION: ARM

## 2024-07-30 ASSESSMENT — PAIN DESCRIPTION - FREQUENCY: FREQUENCY: INTERMITTENT

## 2024-07-30 NOTE — DISCHARGE INSTRUCTIONS
methylprednisolone (injection)  Pronunciation:  METH il pred NIS oh lone  Brand:  A-Methapred, DEPO-Medrol, SOLU-Medrol  What is the most important information I should know about methylprednisolone?  You may not be able to receive a methylprednisolone injection if you have a fungal infection.  What is methylprednisolone?  Methylprednisolone is a steroid that prevents the release of substances in the body that cause inflammation.  Methylprednisolone is used to treat many different inflammatory conditions such as arthritis, lupus, psoriasis, ulcerative colitis, allergic disorders, gland (endocrine) disorders, and conditions that affect the skin, eyes, lungs, stomach, nervous system, or blood cells.  Methylprednisolone may also be used for purposes not listed in this medication guide.  What should I discuss with my healthcare provider before receiving methylprednisolone?  You should not be treated with methylprednisolone if you are allergic to it. You may not be able to receive a methylprednisolone injection if you have a fungal infection.  Methylprednisolone can weaken your immune system, making it easier for you to get an infection. Steroids can also worsen an infection you already have, or reactivate an infection you recently had. Tell your doctor about any illness or infection you have had within the past several weeks.  Tell your doctor if you have ever had:  heart disease, high blood pressure;  a thyroid disorder;  diabetes;  glaucoma or cataracts;  kidney disease;  cirrhosis or other liver disease;  seizures, epilepsy or recent head injury;  past or present tuberculosis;  herpes infection of the eyes;  a condition called scleroderma;  stomach ulcers, ulcerative colitis, diverticulitis, or recent intestinal surgery;  a parasite infection that causes diarrhea (such as threadworms);  mental illness or psychosis;  osteoporosis or low bone mineral density (steroid medication can increase your risk of bone  of this information.

## 2024-07-30 NOTE — PROGRESS NOTES
\[facility]\"" Progress Note  Date: July 30, 2024     Treatment: Solumedrol    Ms. Rodriguez arrived in no acute distress at 1055.    Assessment was unremarkable with the exception of numbness/tingling LLE and left sided weakness. No other concerns voiced. 24G PIV established in L forearm with positive blood return noted.     Problem: Pain  Goal: Verbalizes/displays adequate comfort level or baseline comfort level  Outcome: Progressing     Problem: Safety - Adult  Goal: Free from fall injury  Outcome: Progressing    Ms. Rodriguez's vitals for today's visit.   Patient Vitals for the past 12 hrs:   Temp Pulse Resp BP SpO2   07/30/24 1307 -- 75 -- 96/63 --   07/30/24 1055 98.6 °F (37 °C) 84 18 116/73 100 %     Lab results:  No results found for this or any previous visit (from the past 12 hour(s)).    Peripheral IV 07/30/24 Left Forearm (Active)     Medications given:  Medications Administered         0.9 % sodium chloride infusion Admin Date  07/30/2024 Action  New Bag Dose  75 mL/hr Rate  75 mL/hr Route  IntraVENous Administered By  Nighat Vargas RN        methylPREDNISolone (Solu-MEDROL) 1000 mg in 100 mL NS IVPB minibag Admin Date  07/30/2024 Action  New Bag Dose  1,000 mg Rate  100 mL/hr Route  IntraVENous Administered By  Nighat Vargas RN        sodium chloride flush 0.9 % injection 5-40 mL Admin Date  07/30/2024 Action  Given Dose  10 mL Rate   Route  IntraVENous Administered By  Nighat Vargas, DHRUV          Ms. Rodriguez tolerated the treatment without complaints. PIV flushed, capped and secured for tomorrow's infusion.    [facility] in stable condition at 1310.     Future Appointments   Date Time Provider Department Center   7/31/2024  1:00 PM [facility] INFUSION NURSE 1 [facility]   8/1/2024  1:00 PM [facility] INFUSION NURSE 1 [facility]   8/7/2024  9:00 AM Ana Maria Cooney MD [facility] BS [facility]   10/24/2024  8:00 AM Irina Benedict APRN - [facility]

## 2024-07-31 ENCOUNTER — HOSPITAL ENCOUNTER (OUTPATIENT)
Facility: HOSPITAL | Age: 29
Setting detail: INFUSION SERIES
Discharge: HOME | End: 2024-07-31
Payer: COMMERCIAL

## 2024-07-31 VITALS
HEART RATE: 74 BPM | RESPIRATION RATE: 18 BRPM | OXYGEN SATURATION: 100 % | DIASTOLIC BLOOD PRESSURE: 65 MMHG | SYSTOLIC BLOOD PRESSURE: 106 MMHG | TEMPERATURE: 98.6 F

## 2024-07-31 DIAGNOSIS — G35 MS (MULTIPLE SCLEROSIS) (HCC): Primary | ICD-10-CM

## 2024-07-31 PROCEDURE — 6360000002 HC RX W HCPCS: Performed by: NURSE PRACTITIONER

## 2024-07-31 PROCEDURE — 2580000003 HC RX 258: Performed by: NURSE PRACTITIONER

## 2024-07-31 PROCEDURE — 96365 THER/PROPH/DIAG IV INF INIT: CPT

## 2024-07-31 RX ORDER — SODIUM CHLORIDE 9 MG/ML
5-250 INJECTION, SOLUTION INTRAVENOUS PRN
Status: DISCONTINUED | OUTPATIENT
Start: 2024-07-31 | End: 2024-08-01 | Stop reason: HOSPADM

## 2024-07-31 RX ORDER — SODIUM CHLORIDE 9 MG/ML
5-250 INJECTION, SOLUTION INTRAVENOUS PRN
OUTPATIENT
Start: 2024-08-01

## 2024-07-31 RX ORDER — SODIUM CHLORIDE 0.9 % (FLUSH) 0.9 %
5-40 SYRINGE (ML) INJECTION PRN
Status: CANCELLED | OUTPATIENT
Start: 2024-08-01

## 2024-07-31 RX ORDER — SODIUM CHLORIDE 9 MG/ML
5-250 INJECTION, SOLUTION INTRAVENOUS PRN
Status: CANCELLED | OUTPATIENT
Start: 2024-08-01

## 2024-07-31 RX ORDER — SODIUM CHLORIDE 0.9 % (FLUSH) 0.9 %
5-40 SYRINGE (ML) INJECTION PRN
Status: DISCONTINUED | OUTPATIENT
Start: 2024-07-31 | End: 2024-08-01 | Stop reason: HOSPADM

## 2024-07-31 RX ADMIN — SODIUM CHLORIDE 1000 MG: 900 INJECTION INTRAVENOUS at 13:18

## 2024-07-31 RX ADMIN — SODIUM CHLORIDE 75 ML/HR: 9 INJECTION, SOLUTION INTRAVENOUS at 13:12

## 2024-07-31 RX ADMIN — Medication 10 ML: at 13:10

## 2024-07-31 NOTE — PROGRESS NOTES
[facility] Progress Note  Date: July 31, 2024   Treatment: Solumedrol 2/3    Ms. Rodriguez arrived in no acute distress at 1300.    Assessment was unremarkable with no new concerns voiced. 24G PIV previously established in L forearm, flushed with positive blood return noted.  Problem: Pain  Goal: Verbalizes/displays adequate comfort level or baseline comfort level  Outcome: Progressing     Problem: Safety - Adult  Goal: Free from fall injury  Outcome: Progressing    Ms. Rodriguez's vitals for today's visit.   Patient Vitals for the past 12 hrs:   Temp Pulse Resp BP SpO2   07/31/24 1436 -- 74 -- 106/65 --   07/31/24 1308 98.6 °F (37 °C) 88 18 113/61 100 %     Medications given:  Medications Administered         0.9 % sodium chloride infusion Admin Date  07/31/2024 Action  New Bag Dose  75 mL/hr Rate  75 mL/hr Route  IntraVENous Administered By  Nighat Vargas RN        methylPREDNISolone (Solu-MEDROL) 1000 mg in 100 mL NS IVPB minibag Admin Date  07/31/2024 Action  New Bag Dose  1,000 mg Rate  100 mL/hr Route  IntraVENous Administered By  Nighat Vargas RN        sodium chloride flush 0.9 % injection 5-40 mL Admin Date  07/31/2024 Action  Given Dose  10 mL Rate   Route  IntraVENous Administered By  Nighat Vargas RN          Ms. Rodriguez tolerated the treatment without complaints. PIV flushed, capped and secured for tomorrow's visit.    Ms. Rodriguez was discharged from [facility] in stable condition at 1440.     Future Appointments   Date Time Provider Department Center   8/1/2024  1:00 PM [facility] INFUSION NURSE 1 [facility]   8/7/2024  9:00 AM Ana Maria Cooney MD [facility] BS [facility]   10/24/2024  8:00 AM Irina Benedict APRN - [facility] BS [facility]     Nighat Vargas RN  July 31, 2024  3:00 PM

## 2024-08-01 ENCOUNTER — HOSPITAL ENCOUNTER (OUTPATIENT)
Facility: HOSPITAL | Age: 29
Setting detail: INFUSION SERIES
Discharge: HOME | End: 2024-08-01
Payer: COMMERCIAL

## 2024-08-01 VITALS
HEART RATE: 65 BPM | DIASTOLIC BLOOD PRESSURE: 72 MMHG | OXYGEN SATURATION: 100 % | RESPIRATION RATE: 16 BRPM | SYSTOLIC BLOOD PRESSURE: 107 MMHG

## 2024-08-01 DIAGNOSIS — G35 MS (MULTIPLE SCLEROSIS) (HCC): Primary | ICD-10-CM

## 2024-08-01 PROCEDURE — 96365 THER/PROPH/DIAG IV INF INIT: CPT

## 2024-08-01 PROCEDURE — 6360000002 HC RX W HCPCS: Performed by: NURSE PRACTITIONER

## 2024-08-01 PROCEDURE — 2580000003 HC RX 258: Performed by: NURSE PRACTITIONER

## 2024-08-01 RX ORDER — SODIUM CHLORIDE 0.9 % (FLUSH) 0.9 %
5-40 SYRINGE (ML) INJECTION PRN
Status: DISCONTINUED | OUTPATIENT
Start: 2024-08-01 | End: 2024-08-02 | Stop reason: HOSPADM

## 2024-08-01 RX ORDER — SODIUM CHLORIDE 9 MG/ML
5-250 INJECTION, SOLUTION INTRAVENOUS PRN
Status: DISCONTINUED | OUTPATIENT
Start: 2024-08-01 | End: 2024-08-02 | Stop reason: HOSPADM

## 2024-08-01 RX ORDER — SODIUM CHLORIDE 9 MG/ML
5-250 INJECTION, SOLUTION INTRAVENOUS PRN
Status: CANCELLED | OUTPATIENT
Start: 2024-08-01

## 2024-08-01 RX ORDER — SODIUM CHLORIDE 0.9 % (FLUSH) 0.9 %
5-40 SYRINGE (ML) INJECTION PRN
OUTPATIENT
Start: 2024-08-01

## 2024-08-01 RX ORDER — SODIUM CHLORIDE 9 MG/ML
5-250 INJECTION, SOLUTION INTRAVENOUS PRN
OUTPATIENT
Start: 2024-08-01

## 2024-08-01 RX ADMIN — SODIUM CHLORIDE 25 ML/HR: 9 INJECTION, SOLUTION INTRAVENOUS at 13:14

## 2024-08-01 RX ADMIN — SODIUM CHLORIDE, PRESERVATIVE FREE 10 ML: 5 INJECTION INTRAVENOUS at 13:13

## 2024-08-01 RX ADMIN — SODIUM CHLORIDE 1000 MG: 900 INJECTION INTRAVENOUS at 13:16

## 2024-08-01 NOTE — PROGRESS NOTES
[facility] Short Note                       Date: 2024    Name: Wu Rodriguez    MRN: [id number]         : 1995    Treatment: Solu-Medrol day 3     [facility] COVID-19 SCREENING      The patient was asked the following questions and answered as documented below:    Do you have any symptoms of COVID-19?  SOB, coughing, fever, or generally not feeling well? NO  Have you been exposed to COVID-19 recently? NO  Have you had any recent contact with family/friend that has a pending COVID test? NO      Follow Up: Proceed with treatment    Ms. Rodriguez was assessed and education was provided.     Lines:   Peripheral IV 24 Left Forearm (Active)   Site Assessment Clean, dry & intact 24 1429   Line Status Brisk blood return 24 1429   Line Care Cap changed 24 1321   Phlebitis Assessment No symptoms 24 1429   Infiltration Assessment 0 24 1429   Alcohol Cap Used Yes 24 1321   Dressing Status New dressing applied 24 1429   Dressing Type Transparent 24 1429   Dressing Intervention New 24 1300        Ms. North vitals were reviewed prior to and after treatment.   Patient Vitals for the past 12 hrs:   Pulse Resp BP SpO2   24 1426 65 16 107/72 --   24 1311 79 16 109/63 100 %       Medications given:  Medications Administered         0.9 % sodium chloride infusion Admin Date  2024 Action  New Bag Dose  25 mL/hr Rate  25 mL/hr Route  IntraVENous Administered By  Carmen Mathews RN        methylPREDNISolone (Solu-MEDROL) 1000 mg in 100 mL NS IVPB minibag Admin Date  2024 Action  New Bag Dose  1,000 mg Rate  100 mL/hr Route  IntraVENous Administered By  Carmen Mathews RN        sodium chloride flush 0.9 % injection 5-40 mL Admin Date  2024 Action  Given Dose  10 mL Rate   Route  IntraVENous Administered By  Carmen Mathews RN            Ms. Rodriguez tolerated the treatment without complaints.    Ms. Rodriguez was discharged from

## 2024-08-01 NOTE — DISCHARGE INSTRUCTIONS
methylprednisolone (injection)  Pronunciation:  METH il pred NIS oh lone  Brand:  A-Methapred, DEPO-Medrol, SOLU-Medrol  What is the most important information I should know about methylprednisolone?  You may not be able to receive a methylprednisolone injection if you have a fungal infection.  What is methylprednisolone?  Methylprednisolone is a steroid that prevents the release of substances in the body that cause inflammation.  Methylprednisolone is used to treat many different inflammatory conditions such as arthritis, lupus, psoriasis, ulcerative colitis, allergic disorders, gland (endocrine) disorders, and conditions that affect the skin, eyes, lungs, stomach, nervous system, or blood cells.  Methylprednisolone may also be used for purposes not listed in this medication guide.  What should I discuss with my healthcare provider before receiving methylprednisolone?  You should not be treated with methylprednisolone if you are allergic to it. You may not be able to receive a methylprednisolone injection if you have a fungal infection.  Methylprednisolone can weaken your immune system, making it easier for you to get an infection. Steroids can also worsen an infection you already have, or reactivate an infection you recently had. Tell your doctor about any illness or infection you have had within the past several weeks.  Tell your doctor if you have ever had:  heart disease, high blood pressure;  a thyroid disorder;  diabetes;  glaucoma or cataracts;  kidney disease;  cirrhosis or other liver disease;  seizures, epilepsy or recent head injury;  past or present tuberculosis;  herpes infection of the eyes;  a condition called scleroderma;  stomach ulcers, ulcerative colitis, diverticulitis, or recent intestinal surgery;  a parasite infection that causes diarrhea (such as threadworms);  mental illness or psychosis;  osteoporosis or low bone mineral density (steroid medication can increase your risk of bone  tongue, or throat.  Call your doctor at once if you have:  blurred vision, tunnel vision, eye pain, or seeing halos around lights;  shortness of breath (even with mild exertion), swelling, rapid weight gain;  severe depression, changes in personality, unusual thoughts or behavior;  new or unusual pain in an arm or leg or in your back;  severe pain in your upper stomach spreading to your back, nausea and vomiting;  bloody or tarry stools, coughing up blood or vomit that looks like coffee grounds;  a seizure (convulsions); or  low potassium --leg cramps, constipation, irregular heartbeats, fluttering in your chest, increased thirst or urination, numbness or tingling, muscle weakness or limp feeling.  Methylprednisolone can affect growth in children. Tell your doctor if your child is not growing at a normal rate while using this medicine.  Common side effects may include:  weight gain (especially in your face or your upper back and torso);  slow wound healing;  muscle pain or weakness;  thinning skin, increased sweating;  stomach discomfort, bloating;  headache; or  changes in your menstrual periods.  This is not a complete list of side effects and others may occur. Call your doctor for medical advice about side effects. You may report side effects to FDA at 4-143-FDA-0159.  What other drugs will affect methylprednisolone?  Sometimes it is not safe to use certain medications at the same time. Some drugs can affect your blood levels of other drugs you take, which may increase side effects or make the medications less effective.  Many drugs can affect methylprednisolone. This includes prescription and over-the-counter medicines, vitamins, and herbal products. Not all possible interactions are listed here. Tell your doctor about all your current medicines and any medicine you start or stop using.  Where can I get more information?  Your pharmacist can provide more information about methylprednisolone.  Remember, keep this

## 2024-08-04 SDOH — ECONOMIC STABILITY: FOOD INSECURITY: WITHIN THE PAST 12 MONTHS, YOU WORRIED THAT YOUR FOOD WOULD RUN OUT BEFORE YOU GOT MONEY TO BUY MORE.: NEVER TRUE

## 2024-08-04 SDOH — ECONOMIC STABILITY: FOOD INSECURITY: WITHIN THE PAST 12 MONTHS, THE FOOD YOU BOUGHT JUST DIDN'T LAST AND YOU DIDN'T HAVE MONEY TO GET MORE.: NEVER TRUE

## 2024-08-04 SDOH — ECONOMIC STABILITY: INCOME INSECURITY: HOW HARD IS IT FOR YOU TO PAY FOR THE VERY BASICS LIKE FOOD, HOUSING, MEDICAL CARE, AND HEATING?: SOMEWHAT HARD

## 2024-08-04 SDOH — ECONOMIC STABILITY: HOUSING INSECURITY
IN THE LAST 12 MONTHS, WAS THERE A TIME WHEN YOU DID NOT HAVE A STEADY PLACE TO SLEEP OR SLEPT IN A SHELTER (INCLUDING NOW)?: NO

## 2024-08-04 SDOH — ECONOMIC STABILITY: TRANSPORTATION INSECURITY
IN THE PAST 12 MONTHS, HAS LACK OF TRANSPORTATION KEPT YOU FROM MEETINGS, WORK, OR FROM GETTING THINGS NEEDED FOR DAILY LIVING?: NO

## 2024-08-06 NOTE — PROGRESS NOTES
[facility] Medicine Residency    Subjective   Wu Rodriguez is a 29 y.o. female who presents for Referral - General    #Multiple sclerosis  - needs referral for new neurologist  - on Cladribine, Ofatumumab   - prior neurologist Dr. Michelle Martin, but wants someone who specializes in MS and feels current office is not what she needs currently  - interested in seeing Dr. Mueller ([facility]) or Dr. Schmitt ([facility])  - also endorses R lat muscle spasm and pain, and would like to see someone about this especially if it is related to MS    Review of Systems   Review of Systems   Constitutional: Negative.    HENT: Negative.     Eyes: Negative.    Respiratory: Negative.     Cardiovascular: Negative.    Gastrointestinal: Negative.    Genitourinary: Negative.    Musculoskeletal:  Positive for back pain.   Skin: Negative.    Neurological: Negative.           Medical History  Past Medical History:   Diagnosis Date    MS (multiple sclerosis) ([facility]) 2012    Has been following a neurologist in [place] (Dr. Jhon Barnhart); takes kesimpta regularly and steroids during a flare up       Medications  Current Outpatient Medications   Medication Sig    Cladribine (MAVENCLAD, 6 TABS, PO) Take 10 mg by mouth every 30 days Monthly for 2 months, then repeat the following year    Ofatumumab 20 MG/0.4ML SOAJ Inject 20 mg into the skin every 28 days (Patient not taking: Reported on 8/7/2024)     No current facility-administered medications for this visit.       Immunizations   Immunization History   Administered Date(s) Administered    COVID-19, MODERNA BLUE border, Primary or Immunocompromised, (age 12y+), IM, 100 mcg/0.5mL 03/19/2021    COVID-19, MODERNA Booster BLUE border, (age 18y+), IM, 50mcg/0.25mL 04/21/2021       Allergies   No Known Allergies    Objective   Vital Signs  /69 (Site: Left Upper Arm, Position: Sitting, Cuff Size: Small Adult)   Pulse 76   Temp 98 °F (36.7 °C) (Temporal)   Resp 18

## 2024-08-07 ENCOUNTER — OFFICE VISIT (OUTPATIENT)
Age: 29
End: 2024-08-07
Payer: COMMERCIAL

## 2024-08-07 VITALS
OXYGEN SATURATION: 99 % | SYSTOLIC BLOOD PRESSURE: 107 MMHG | RESPIRATION RATE: 18 BRPM | TEMPERATURE: 98 F | HEART RATE: 76 BPM | BODY MASS INDEX: 24.98 KG/M2 | DIASTOLIC BLOOD PRESSURE: 69 MMHG | WEIGHT: 141 LBS | HEIGHT: 63 IN

## 2024-08-07 DIAGNOSIS — G35 MS (MULTIPLE SCLEROSIS) (HCC): ICD-10-CM

## 2024-08-07 DIAGNOSIS — G35 MULTIPLE SCLEROSIS (HCC): Primary | ICD-10-CM

## 2024-08-07 PROCEDURE — 99212 OFFICE O/P EST SF 10 MIN: CPT

## 2024-08-07 ASSESSMENT — ENCOUNTER SYMPTOMS
RESPIRATORY NEGATIVE: 1
GASTROINTESTINAL NEGATIVE: 1
EYES NEGATIVE: 1
BACK PAIN: 1

## 2024-08-07 ASSESSMENT — PATIENT HEALTH QUESTIONNAIRE - PHQ9
1. LITTLE INTEREST OR PLEASURE IN DOING THINGS: NOT AT ALL
SUM OF ALL RESPONSES TO PHQ QUESTIONS 1-9: 0
SUM OF ALL RESPONSES TO PHQ QUESTIONS 1-9: 0
SUM OF ALL RESPONSES TO PHQ9 QUESTIONS 1 & 2: 0
SUM OF ALL RESPONSES TO PHQ QUESTIONS 1-9: 0
2. FEELING DOWN, DEPRESSED OR HOPELESS: NOT AT ALL
SUM OF ALL RESPONSES TO PHQ QUESTIONS 1-9: 0

## 2024-08-07 NOTE — ASSESSMENT & PLAN NOTE
Monitored by specialist- no acute findings meriting change in the plan  Currently sees Dr. Martin but have placed referral to Dr. Mueller at [facility] who specializes in MS.   Otherwise, pt doing well on current MS medications and is in contact with current neurologist as needed.   Also endorses back spasms/pain so will refer to Sports Medicine clinic here for further evaluation.

## 2024-08-07 NOTE — PATIENT INSTRUCTIONS
Thank you for your visit. I have sent a referral for Dr. Mueller at [facility] so please wait to hear from them about scheduling an appointment.    I also have let Dr. Velarde and the Sports Medicine clinic know about your muscle spasms and pain, so please make an appointment at the .     If you have any issues with getting into the Neurologist or need any further help, please reach out.  It was a pleasure to take care of you today!    Thank you,  Dr. Cooney

## 2024-08-07 NOTE — PROGRESS NOTES
Room 20    Patient is accompanied by self. I have received verbal consent from Wu Rodriguez to discuss any/all medical information while they are present in the room.    Identified pt with two pt identifiers(name and ). Reviewed record in preparation for visit and have obtained necessary documentation.  Chief Complaint   Patient presents with    Referral - General     Sees Dr Betty Martin and not happy with care and wants referral to another neuro that specializes in MS   Wants referral to Dr Roro Mueller or Goran Schmitt MD    Health Maintenance Due   Topic    Hepatitis B vaccine (1 of 3 - 3-dose series)    Varicella vaccine (1 of 2 - 2-dose childhood series)    DTaP/Tdap/Td vaccine (1 - Tdap)    COVID-19 Vaccine (3 - 2023-24 season)    Depression Screen     Flu vaccine (1)       Vitals:    24 0927   BP: 107/69   Site: Left Upper Arm   Position: Sitting   Cuff Size: Small Adult   Pulse: 76   Resp: 18   Temp: 98 °F (36.7 °C)   TempSrc: Temporal   SpO2: 99%   Weight: 64 kg (141 lb)   Height: 1.6 m (5' 3\")       Social Determinants Of Health:       SDOH screening not required at visit.  Resources Declined.   See AVS for attached resources, if requested.    Coordination of Care Questionnaire:       \"Have you been to the ER, urgent care clinic since your last visit?  Hospitalized since your last visit?\"    NO    “Have you seen or consulted any other health care providers outside of [facility] since your last visit?”    NO            Click Here for Release of Records Request

## 2024-08-08 NOTE — PROGRESS NOTES
[facility] Residency Attending Attestation: I have seen and evaluated the patient, repeating/performing the critical or key elements of the service. I discussed the findings, assessment and plan with the resident and agree with the resident's documentation.    Goran Haile MD, MPH  [facility]

## 2024-08-20 ENCOUNTER — OFFICE VISIT (OUTPATIENT)
Age: 29
End: 2024-08-20
Payer: COMMERCIAL

## 2024-08-20 VITALS
OXYGEN SATURATION: 98 % | BODY MASS INDEX: 24.95 KG/M2 | SYSTOLIC BLOOD PRESSURE: 96 MMHG | RESPIRATION RATE: 18 BRPM | WEIGHT: 140.8 LBS | HEART RATE: 68 BPM | HEIGHT: 63 IN | TEMPERATURE: 97.7 F | DIASTOLIC BLOOD PRESSURE: 54 MMHG

## 2024-08-20 DIAGNOSIS — G89.29 CHRONIC RIGHT SHOULDER PAIN: Primary | ICD-10-CM

## 2024-08-20 DIAGNOSIS — M25.511 CHRONIC RIGHT SHOULDER PAIN: Primary | ICD-10-CM

## 2024-08-20 PROCEDURE — 99213 OFFICE O/P EST LOW 20 MIN: CPT | Performed by: FAMILY MEDICINE

## 2024-08-20 ASSESSMENT — ANXIETY QUESTIONNAIRES
3. WORRYING TOO MUCH ABOUT DIFFERENT THINGS: SEVERAL DAYS
5. BEING SO RESTLESS THAT IT IS HARD TO SIT STILL: SEVERAL DAYS
IF YOU CHECKED OFF ANY PROBLEMS ON THIS QUESTIONNAIRE, HOW DIFFICULT HAVE THESE PROBLEMS MADE IT FOR YOU TO DO YOUR WORK, TAKE CARE OF THINGS AT HOME, OR GET ALONG WITH OTHER PEOPLE: SOMEWHAT DIFFICULT
6. BECOMING EASILY ANNOYED OR IRRITABLE: NOT AT ALL
2. NOT BEING ABLE TO STOP OR CONTROL WORRYING: NOT AT ALL
1. FEELING NERVOUS, ANXIOUS, OR ON EDGE: NOT AT ALL
GAD7 TOTAL SCORE: 3
4. TROUBLE RELAXING: SEVERAL DAYS
7. FEELING AFRAID AS IF SOMETHING AWFUL MIGHT HAPPEN: NOT AT ALL

## 2024-08-20 ASSESSMENT — PATIENT HEALTH QUESTIONNAIRE - PHQ9
2. FEELING DOWN, DEPRESSED OR HOPELESS: NOT AT ALL
SUM OF ALL RESPONSES TO PHQ QUESTIONS 1-9: 6
9. THOUGHTS THAT YOU WOULD BE BETTER OFF DEAD, OR OF HURTING YOURSELF: NOT AT ALL
10. IF YOU CHECKED OFF ANY PROBLEMS, HOW DIFFICULT HAVE THESE PROBLEMS MADE IT FOR YOU TO DO YOUR WORK, TAKE CARE OF THINGS AT HOME, OR GET ALONG WITH OTHER PEOPLE: SOMEWHAT DIFFICULT
SUM OF ALL RESPONSES TO PHQ QUESTIONS 1-9: 6
SUM OF ALL RESPONSES TO PHQ QUESTIONS 1-9: 6
8. MOVING OR SPEAKING SO SLOWLY THAT OTHER PEOPLE COULD HAVE NOTICED. OR THE OPPOSITE, BEING SO FIGETY OR RESTLESS THAT YOU HAVE BEEN MOVING AROUND A LOT MORE THAN USUAL: NOT AT ALL
SUM OF ALL RESPONSES TO PHQ9 QUESTIONS 1 & 2: 3
SUM OF ALL RESPONSES TO PHQ QUESTIONS 1-9: 6
7. TROUBLE CONCENTRATING ON THINGS, SUCH AS READING THE NEWSPAPER OR WATCHING TELEVISION: SEVERAL DAYS
3. TROUBLE FALLING OR STAYING ASLEEP: SEVERAL DAYS
4. FEELING TIRED OR HAVING LITTLE ENERGY: SEVERAL DAYS
1. LITTLE INTEREST OR PLEASURE IN DOING THINGS: NEARLY EVERY DAY
6. FEELING BAD ABOUT YOURSELF - OR THAT YOU ARE A FAILURE OR HAVE LET YOURSELF OR YOUR FAMILY DOWN: NOT AT ALL
5. POOR APPETITE OR OVEREATING: NOT AT ALL

## 2024-08-20 NOTE — PROGRESS NOTES
[facility] Family Medicine Residency   [facility] Sports Medicine      Chief Complaint:   Chief Complaint   Patient presents with    Shoulder Pain       Subjective:   History: This patient is a 29 y.o. female with a chief complaint of intermittent non radiating right upper back/posterior shoulder pain for several years worsened 2 weeks. Denies recent injury.  Pain worsened with prolonged sitting. Has not tried any medication for pain. Denies numbness or tingling in right upper extremity. Denies neck pain. States working from home and spends long periods of time on computer.     Review of Systems:  General/Constitutional:  No fever, chills, sweats, fatigue, night sweats, weakness, weight loss or weight gain   Head: No headache, no trauma   Neck: No swelling, masses, stiffness, pain, or limited movement   Cardiac: No chest pain   Respiratory: No cough, shortness of breath, or dyspnea on exertion   GI: No incontinence. No nausea/vomiting, diarrhea, abdominal pain, bloody or dark stools  : No incontinence. No change in urinary habits.  Peripheral Vascular: No edema, coldness, numbness, discoloration, pain, or paresthesias   Musculoskeletal: As per HPI  Neurological: No saddle distribution paresthesia. No siatic pain. No loss of consciousness, dizziness, seizures, dysarthria, cognitive changes, problems with balance, or unilateral weakness.    Past Medical History:   Diagnosis Date    MS (multiple sclerosis) ([facility]) 2012    Has been following a neurologist in [place] (Dr. Jhon Barnhart); takes kesimpta regularly and steroids during a flare up     Family History   Problem Relation Age of Onset    No Known Problems Mother     No Known Problems Father      Current Outpatient Medications   Medication Sig Dispense Refill    Cladribine (MAVENCLAD, 6 TABS, PO) Take 10 mg by mouth every 30 days Monthly for 2 months, then repeat the following year (Patient not taking: Reported on

## 2024-08-20 NOTE — PROGRESS NOTES
Pt dx with MS  2012 and is currently having RT sided back pain. 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 She has done PT to help her walk again but the spasms in her back are still very painful.     Identified pt with two pt identifiers(name and ). Reviewed record in preparation for visit and have obtained necessary documentation.  Chief Complaint   Patient presents with    Shoulder Pain        Vitals:    24 0929   BP: (!) 96/54   Site: Left Upper Arm   Position: Sitting   Cuff Size: Medium Adult   Pulse: 68   Resp: 18   Temp: 97.7 °F (36.5 °C)   TempSrc: Oral   SpO2: 98%   Weight: 63.9 kg (140 lb 12.8 oz)   Height: 1.6 m (5' 3\")         Coordination of Care Questionnaire:  :     \"Have you been to the ER, urgent care clinic since your last visit?  Hospitalized since your last visit?\"    NO    “Have you seen or consulted any other health care providers outside of [facility] since your last visit?”    NO            Click Here for Release of Records Request

## 2024-09-18 DIAGNOSIS — G35 RELAPSING REMITTING MULTIPLE SCLEROSIS (HCC): ICD-10-CM

## 2024-09-25 LAB
BASOPHILS # BLD AUTO: 0 X10E3/UL (ref 0–0.2)
BASOPHILS NFR BLD AUTO: 0 %
EOSINOPHIL # BLD AUTO: 0.1 X10E3/UL (ref 0–0.4)
EOSINOPHIL NFR BLD AUTO: 1 %
ERYTHROCYTE [DISTWIDTH] IN BLOOD BY AUTOMATED COUNT: 12.4 % (ref 11.7–15.4)
HCT VFR BLD AUTO: 42.2 % (ref 34–46.6)
HGB BLD-MCNC: 13.9 G/DL (ref 11.1–15.9)
IMM GRANULOCYTES # BLD AUTO: 0 X10E3/UL (ref 0–0.1)
IMM GRANULOCYTES NFR BLD AUTO: 0 %
LYMPHOCYTES # BLD AUTO: 1.3 X10E3/UL (ref 0.7–3.1)
LYMPHOCYTES NFR BLD AUTO: 23 %
MCH RBC QN AUTO: 31.4 PG (ref 26.6–33)
MCHC RBC AUTO-ENTMCNC: 32.9 G/DL (ref 31.5–35.7)
MCV RBC AUTO: 96 FL (ref 79–97)
MONOCYTES # BLD AUTO: 0.6 X10E3/UL (ref 0.1–0.9)
MONOCYTES NFR BLD AUTO: 10 %
NEUTROPHILS # BLD AUTO: 3.7 X10E3/UL (ref 1.4–7)
NEUTROPHILS NFR BLD AUTO: 66 %
PLATELET # BLD AUTO: 269 X10E3/UL (ref 150–450)
RBC # BLD AUTO: 4.42 X10E6/UL (ref 3.77–5.28)
WBC # BLD AUTO: 5.6 X10E3/UL (ref 3.4–10.8)

## 2024-10-24 ENCOUNTER — TELEMEDICINE (OUTPATIENT)
Age: 29
End: 2024-10-24
Payer: COMMERCIAL

## 2024-10-24 DIAGNOSIS — G35 RELAPSING REMITTING MULTIPLE SCLEROSIS (HCC): ICD-10-CM

## 2024-10-24 DIAGNOSIS — R79.89 LOW SERUM THYROID STIMULATING HORMONE (TSH): ICD-10-CM

## 2024-10-24 DIAGNOSIS — G35 RELAPSING REMITTING MULTIPLE SCLEROSIS (HCC): Primary | ICD-10-CM

## 2024-10-24 PROCEDURE — 99214 OFFICE O/P EST MOD 30 MIN: CPT | Performed by: NURSE PRACTITIONER

## 2024-10-24 NOTE — PROGRESS NOTES
[facility] NEUROSCIENCE [facility] MEDICAL/EMERGENCY CENTER  NEUROLOGY CLINIC   [address]   [place] [address]   [phone] Office   [phone] Fax           Date:  10/24/24     Name:  WU SQUIRES  :  1995  MRN:  [id number]     PCP:  Ana Maria Cooney MD    Wu Squires is a 29 y.o. female who was seen by synchronous (real-time) audio-video technology on 10/24/2024 for Multiple Sclerosis    Subjective:   At her last visit, she was having an acute exacerbation noted by numbness in her ear extending into the left leg and left arm, weakness, and increased fatigue. She did the IV solumedrol which helped a lot. Baseline, she has some persistent tingling and it will feel heavy at times as well. She did do the first year cycle of Mavenclad and finished . The Mavenclad nurse warned her that the month of October would be tougher due to taking the Mavenclad. She felt tired, headache. She did have some diarrhea and some loss of appetite. This did resolve and she is now back to her baseline.     MS history:  Diagnosed in . Her presenting symptoms were numbness and loss of coordination in the left side. She remembers having a severe migraine and the arm started to go numb. She was hospitalized that December and over the course of the next nine days. She was started with DMT Copaxone. Due to a relapse and progression and lack of compliance, she was started on three days a week Copaxone but still did not respond well to this. She started having left facial drop. She was switched to Gilenya and she did well for about four years. She was then switched to Kesimpta. While on Kesimpta, she has done well but due to family planning, she wanted to switch to something that would be pregnancy safe. Baseline, she has some persistent tingling and it will feel heavy at times as well.     Previous DMT  Copaxone-discontinued due to multiple relapses  Gilenya-discontinued

## 2024-11-08 LAB
BASOPHILS # BLD AUTO: 0 X10E3/UL (ref 0–0.2)
BASOPHILS NFR BLD AUTO: 1 %
EOSINOPHIL # BLD AUTO: 0.1 X10E3/UL (ref 0–0.4)
EOSINOPHIL NFR BLD AUTO: 1 %
ERYTHROCYTE [DISTWIDTH] IN BLOOD BY AUTOMATED COUNT: 11.8 % (ref 11.7–15.4)
HCT VFR BLD AUTO: 41.3 % (ref 34–46.6)
HGB BLD-MCNC: 13.6 G/DL (ref 11.1–15.9)
IMM GRANULOCYTES # BLD AUTO: 0 X10E3/UL (ref 0–0.1)
IMM GRANULOCYTES NFR BLD AUTO: 0 %
LYMPHOCYTES # BLD AUTO: 1.1 X10E3/UL (ref 0.7–3.1)
LYMPHOCYTES NFR BLD AUTO: 27 %
MCH RBC QN AUTO: 30.4 PG (ref 26.6–33)
MCHC RBC AUTO-ENTMCNC: 32.9 G/DL (ref 31.5–35.7)
MCV RBC AUTO: 92 FL (ref 79–97)
MONOCYTES # BLD AUTO: 0.4 X10E3/UL (ref 0.1–0.9)
MONOCYTES NFR BLD AUTO: 10 %
NEUTROPHILS # BLD AUTO: 2.5 X10E3/UL (ref 1.4–7)
NEUTROPHILS NFR BLD AUTO: 61 %
PLATELET # BLD AUTO: 238 X10E3/UL (ref 150–450)
RBC # BLD AUTO: 4.48 X10E6/UL (ref 3.77–5.28)
WBC # BLD AUTO: 4.2 X10E3/UL (ref 3.4–10.8)

## 2024-11-09 LAB
T4 FREE SERPL-MCNC: 1.29 NG/DL (ref 0.82–1.77)
TSH SERPL DL<=0.005 MIU/L-ACNC: 0.91 UIU/ML (ref 0.45–4.5)

## 2024-12-11 RX ORDER — SODIUM CHLORIDE 9 MG/ML
5-250 INJECTION, SOLUTION INTRAVENOUS PRN
Status: CANCELLED | OUTPATIENT
Start: 2024-12-17

## 2024-12-17 ENCOUNTER — HOSPITAL ENCOUNTER (OUTPATIENT)
Facility: HOSPITAL | Age: 29
Setting detail: INFUSION SERIES
Discharge: HOME | End: 2024-12-17
Payer: COMMERCIAL

## 2024-12-17 VITALS
TEMPERATURE: 98.1 F | DIASTOLIC BLOOD PRESSURE: 60 MMHG | RESPIRATION RATE: 16 BRPM | HEART RATE: 77 BPM | OXYGEN SATURATION: 100 % | SYSTOLIC BLOOD PRESSURE: 106 MMHG

## 2024-12-17 DIAGNOSIS — G35 MS (MULTIPLE SCLEROSIS) (HCC): Primary | ICD-10-CM

## 2024-12-17 PROCEDURE — 2580000003 HC RX 258: Performed by: NURSE PRACTITIONER

## 2024-12-17 PROCEDURE — 96365 THER/PROPH/DIAG IV INF INIT: CPT

## 2024-12-17 PROCEDURE — 6360000002 HC RX W HCPCS: Performed by: NURSE PRACTITIONER

## 2024-12-17 RX ORDER — SODIUM CHLORIDE 9 MG/ML
5-250 INJECTION, SOLUTION INTRAVENOUS PRN
Status: CANCELLED | OUTPATIENT
Start: 2024-12-18

## 2024-12-17 RX ORDER — SODIUM CHLORIDE 0.9 % (FLUSH) 0.9 %
5-40 SYRINGE (ML) INJECTION PRN
Status: CANCELLED | OUTPATIENT
Start: 2024-12-18

## 2024-12-17 RX ORDER — SODIUM CHLORIDE 0.9 % (FLUSH) 0.9 %
5-40 SYRINGE (ML) INJECTION PRN
Status: DISCONTINUED | OUTPATIENT
Start: 2024-12-17 | End: 2024-12-18 | Stop reason: HOSPADM

## 2024-12-17 RX ADMIN — SODIUM CHLORIDE 10 ML: 9 INJECTION, SOLUTION INTRAMUSCULAR; INTRAVENOUS; SUBCUTANEOUS at 09:21

## 2024-12-17 RX ADMIN — SODIUM CHLORIDE 1000 MG: 900 INJECTION INTRAVENOUS at 09:38

## 2024-12-17 RX ADMIN — SODIUM CHLORIDE 20 ML: 9 INJECTION, SOLUTION INTRAMUSCULAR; INTRAVENOUS; SUBCUTANEOUS at 10:41

## 2024-12-17 NOTE — PROGRESS NOTES
[facility] Short Note                       Date: 2024    Name: Wu Rodriguez    MRN: [id number]         : 1995    Treatment: Solu-Medrol day 1     [facility] COVID-19 SCREENING      The patient was asked the following questions and answered as documented below:    Do you have any symptoms of COVID-19?  SOB, coughing, fever, or generally not feeling well? NO  Have you been exposed to COVID-19 recently? NO  Have you had any recent contact with family/friend that has a pending COVID test? NO      Follow Up: Proceed with treatment    Ms. Rodriguez was assessed and education was provided. C/o left side weakness, numbness and tingling.   VSS, IV placed to the RFA.     Lines:   Peripheral IV 24 Proximal;Right;Anterior Forearm (Active)   Site Assessment Clean, dry & intact 24   Line Status Brisk blood return 24   Phlebitis Assessment No symptoms 24   Infiltration Assessment 0 24   Dressing Status New dressing applied 24   Dressing Type Transparent 24   Dressing Intervention New 24        Ms. Rodriguez's vitals were reviewed prior to and after treatment.   Patient Vitals for the past 12 hrs:   Temp Pulse Resp BP SpO2   24 0908 98.1 °F (36.7 °C) 77 16 106/60 100 %         Medications given:  Medications Administered         methylPREDNISolone (Solu-MEDROL) 1000 mg in 100 mL NS IVPB minibag Admin Date  2024 Action  New Bag Dose  1,000 mg Rate  100 mL/hr Route  IntraVENous Documented By  Carmen Mathews RN        sodium chloride flush 0.9 % injection 5-40 mL Admin Date  2024 Action  Given Dose  10 mL Rate   Route  IntraVENous Documented By  Carmen Mathews RN        sodium chloride flush 0.9 % injection 5-40 mL Admin Date  2024 Action  Given Dose  20 mL Rate   Route  IntraVENous Documented By  Carmen Mathews, RN            Ms. Rodriguez tolerated the treatment without complaints. VSS post treatment. IV  flushed, wrapped and left in placed for tomorrow.     [facility] in [facility] condition at 1045.      Patient provided with AVS , which includes future appointment and written educational material.     Future Appointments   Date Time Provider Department Center   12/18/2024  1:00 PM [facility] INFUSION NURSE 2 [facility]   12/19/2024  2:00 PM [facility] INFUSION NURSE 2 [facility]   2/27/2025  8:00 AM Irina Benedict APRN - NP NEUROWRSPPBB BS [facility]   5/8/2025  9:00 AM Irina Benedict APRN - NP NEUROWRSPPBB BS [facility]       BRAN GOVEA RN  December 17, 2024  10:29 AM

## 2024-12-18 ENCOUNTER — HOSPITAL ENCOUNTER (OUTPATIENT)
Facility: HOSPITAL | Age: 29
Setting detail: INFUSION SERIES
Discharge: HOME | End: 2024-12-18
Payer: COMMERCIAL

## 2024-12-18 VITALS
RESPIRATION RATE: 16 BRPM | TEMPERATURE: 98 F | SYSTOLIC BLOOD PRESSURE: 108 MMHG | HEART RATE: 64 BPM | DIASTOLIC BLOOD PRESSURE: 65 MMHG | OXYGEN SATURATION: 100 %

## 2024-12-18 DIAGNOSIS — G35 MS (MULTIPLE SCLEROSIS) (HCC): Primary | ICD-10-CM

## 2024-12-18 PROCEDURE — 6360000002 HC RX W HCPCS: Performed by: NURSE PRACTITIONER

## 2024-12-18 PROCEDURE — 96365 THER/PROPH/DIAG IV INF INIT: CPT

## 2024-12-18 PROCEDURE — 2500000003 HC RX 250 WO HCPCS: Performed by: NURSE PRACTITIONER

## 2024-12-18 PROCEDURE — 2580000003 HC RX 258: Performed by: NURSE PRACTITIONER

## 2024-12-18 RX ORDER — SODIUM CHLORIDE 9 MG/ML
5-250 INJECTION, SOLUTION INTRAVENOUS PRN
OUTPATIENT
Start: 2024-12-19

## 2024-12-18 RX ORDER — SODIUM CHLORIDE 0.9 % (FLUSH) 0.9 %
5-40 SYRINGE (ML) INJECTION PRN
Status: DISCONTINUED | OUTPATIENT
Start: 2024-12-18 | End: 2024-12-19 | Stop reason: HOSPADM

## 2024-12-18 RX ORDER — SODIUM CHLORIDE 0.9 % (FLUSH) 0.9 %
5-40 SYRINGE (ML) INJECTION PRN
Status: CANCELLED | OUTPATIENT
Start: 2024-12-19

## 2024-12-18 RX ADMIN — SODIUM CHLORIDE 10 ML: 9 INJECTION, SOLUTION INTRAMUSCULAR; INTRAVENOUS; SUBCUTANEOUS at 14:20

## 2024-12-18 RX ADMIN — SODIUM CHLORIDE 10 ML: 9 INJECTION, SOLUTION INTRAMUSCULAR; INTRAVENOUS; SUBCUTANEOUS at 13:17

## 2024-12-18 RX ADMIN — SODIUM CHLORIDE 1000 MG: 900 INJECTION INTRAVENOUS at 13:19

## 2024-12-18 NOTE — PROGRESS NOTES
[facility] Short Note                       Date: 2024    Name: Wu Rodriguez    MRN: [id number]         : 1995    Treatment: Solu-Medrol    [facility] COVID-19 SCREENING      The patient was asked the following questions and answered as documented below:    Do you have any symptoms of COVID-19?  SOB, coughing, fever, or generally not feeling well? NO  Have you been exposed to COVID-19 recently? NO  Have you had any recent contact with family/friend that has a pending COVID test? NO      Follow Up: Proceed with treatment    Ms. Rodriguez was assessed and education was provided. No changes to report. VSS. IV in the right arm infiltrated, line removed. IV replaced to the LAC w/o difficulty.     Lines:   Peripheral IV 24 Left Antecubital (Active)   Site Assessment Clean, dry & intact 24 1316   Line Status Brisk blood return 24 1316   Phlebitis Assessment No symptoms 24 131   Infiltration Assessment 0 24 1316   Dressing Status New dressing applied 24 1316   Dressing Type Transparent 24 1316   Dressing Intervention New 24 1316        Ms. Zuluagas vitals were reviewed prior to and after treatment.   Patient Vitals for the past 12 hrs:   Temp Pulse Resp BP SpO2   24 1309 98 °F (36.7 °C) 64 16 108/65 100 %         Medications given:  Medications Administered         methylPREDNISolone (Solu-MEDROL) 1000 mg in 100 mL NS IVPB minibag Admin Date  2024 Action  New Bag Dose  1,000 mg Rate  100 mL/hr Route  IntraVENous Documented By  Carmen Mathews RN        sodium chloride flush 0.9 % injection 5-40 mL Admin Date  2024 Action  Given Dose  10 mL Rate   Route  IntraVENous Documented By  Carmen Mathews RN        sodium chloride flush 0.9 % injection 5-40 mL Admin Date  2024 Action  Given Dose  10 mL Rate   Route  IntraVENous Documented By  Carmen Mathews, RN            Ms. Rodriguez tolerated the treatment without complaints. IV flushed and

## 2024-12-19 ENCOUNTER — HOSPITAL ENCOUNTER (OUTPATIENT)
Facility: HOSPITAL | Age: 29
Setting detail: INFUSION SERIES
Discharge: HOME | End: 2024-12-19
Payer: COMMERCIAL

## 2024-12-19 VITALS
HEART RATE: 64 BPM | SYSTOLIC BLOOD PRESSURE: 101 MMHG | OXYGEN SATURATION: 100 % | TEMPERATURE: 98.9 F | RESPIRATION RATE: 16 BRPM | DIASTOLIC BLOOD PRESSURE: 64 MMHG

## 2024-12-19 DIAGNOSIS — G35 MS (MULTIPLE SCLEROSIS) (HCC): Primary | ICD-10-CM

## 2024-12-19 PROCEDURE — 96365 THER/PROPH/DIAG IV INF INIT: CPT

## 2024-12-19 PROCEDURE — 2580000003 HC RX 258: Performed by: NURSE PRACTITIONER

## 2024-12-19 PROCEDURE — 2500000003 HC RX 250 WO HCPCS: Performed by: NURSE PRACTITIONER

## 2024-12-19 PROCEDURE — 6360000002 HC RX W HCPCS: Performed by: NURSE PRACTITIONER

## 2024-12-19 RX ORDER — SODIUM CHLORIDE 0.9 % (FLUSH) 0.9 %
5-40 SYRINGE (ML) INJECTION PRN
Status: DISCONTINUED | OUTPATIENT
Start: 2024-12-19 | End: 2024-12-20 | Stop reason: HOSPADM

## 2024-12-19 RX ORDER — SODIUM CHLORIDE 0.9 % (FLUSH) 0.9 %
5-40 SYRINGE (ML) INJECTION PRN
OUTPATIENT
Start: 2024-12-20

## 2024-12-19 RX ORDER — SODIUM CHLORIDE 9 MG/ML
5-250 INJECTION, SOLUTION INTRAVENOUS PRN
OUTPATIENT
Start: 2024-12-20

## 2024-12-19 RX ADMIN — Medication 10 ML: at 14:10

## 2024-12-19 RX ADMIN — Medication 10 ML: at 15:13

## 2024-12-19 RX ADMIN — SODIUM CHLORIDE 1000 MG: 900 INJECTION INTRAVENOUS at 14:09

## 2024-12-19 NOTE — PROGRESS NOTES
[facility] Progress Note  Date: December 19, 2024     Treatment: Solumedrol    Ms. Rodriguez arrived in no acute distress at 1355.    Assessment was unremarkable with no new concerns voiced. 24G PIV established in LAC with positive blood return noted.     Problem: Safety - Adult  Goal: Free from fall injury  12/19/2024 1444 by Nighat Vargas RN  Outcome: Progressing     Problem: Chronic Conditions and Co-morbidities  Goal: Patient's chronic conditions and co-morbidity symptoms are monitored and maintained or improved  12/19/2024 1444 by Nighat Vargas RN  Outcome: Progressing    Ms. Rodriguez's vitals for today's visit.   Patient Vitals for the past 12 hrs:   Temp Pulse Resp BP SpO2   12/19/24 1512 -- 64 -- 101/64 --   12/19/24 1400 98.9 °F (37.2 °C) 71 16 99/61 100 %     Lab results:  No results found for this or any previous visit (from the past 12 hour(s)).    Peripheral IV 12/19/24 Left;Proximal Forearm (Active)     Medications given:  Medications Administered         methylPREDNISolone (Solu-MEDROL) 1000 mg in 100 mL NS IVPB minibag Admin Date  12/19/2024 Action  New Bag Dose  1,000 mg Rate  100 mL/hr Route  IntraVENous Documented By  Nighat Vargas RN        sodium chloride flush 0.9 % injection 5-40 mL Admin Date  12/19/2024 Action  Given Dose  10 mL Rate   Route  IntraVENous Documented By  Nighat Vargas RN        sodium chloride flush 0.9 % injection 5-40 mL Admin Date  12/19/2024 Action  Given Dose  10 mL Rate   Route  IntraVENous Documented By  Nighat Vargas, DHRUV          Ms. Rodriguez tolerated the treatment without complaints. PIV flushed and removed, 2x2 and coban placed.    Ms. Rodriguez was [facility] in stable condition at 1515.     Future Appointments   Date Time Provider Department Center   2/27/2025  8:00 AM Irina Benedict APRN - NP NEUROWRSPPBB BS [facility]   5/8/2025  9:00 AM Irina Benedict APRN - NP NEUROWRSPPBB BS [facility]     Nighat Vargas RN  December

## 2025-01-03 ENCOUNTER — TELEPHONE (OUTPATIENT)
Age: 30
End: 2025-01-03

## 2025-01-08 NOTE — TELEPHONE ENCOUNTER
Requesting the order for the MRI of the Thoracic Spine with and without contrast be faxed to :     [phone]

## 2025-01-13 ENCOUNTER — HOSPITAL ENCOUNTER (OUTPATIENT)
Facility: HOSPITAL | Age: 30
Setting detail: INFUSION SERIES
Discharge: HOME | End: 2025-01-13
Payer: COMMERCIAL

## 2025-01-13 VITALS
RESPIRATION RATE: 16 BRPM | TEMPERATURE: 98.2 F | OXYGEN SATURATION: 100 % | HEART RATE: 71 BPM | DIASTOLIC BLOOD PRESSURE: 59 MMHG | SYSTOLIC BLOOD PRESSURE: 109 MMHG

## 2025-01-13 DIAGNOSIS — G35 MS (MULTIPLE SCLEROSIS) (HCC): Primary | ICD-10-CM

## 2025-01-13 PROCEDURE — 96365 THER/PROPH/DIAG IV INF INIT: CPT

## 2025-01-13 PROCEDURE — 6360000002 HC RX W HCPCS: Performed by: NURSE PRACTITIONER

## 2025-01-13 PROCEDURE — 2580000003 HC RX 258: Performed by: NURSE PRACTITIONER

## 2025-01-13 PROCEDURE — 2500000003 HC RX 250 WO HCPCS: Performed by: NURSE PRACTITIONER

## 2025-01-13 RX ORDER — SODIUM CHLORIDE 0.9 % (FLUSH) 0.9 %
5-40 SYRINGE (ML) INJECTION PRN
Status: DISCONTINUED | OUTPATIENT
Start: 2025-01-13 | End: 2025-01-14 | Stop reason: HOSPADM

## 2025-01-13 RX ORDER — SODIUM CHLORIDE 0.9 % (FLUSH) 0.9 %
5-40 SYRINGE (ML) INJECTION PRN
Status: CANCELLED | OUTPATIENT
Start: 2025-01-14

## 2025-01-13 RX ORDER — SODIUM CHLORIDE 9 MG/ML
5-250 INJECTION, SOLUTION INTRAVENOUS PRN
Status: CANCELLED | OUTPATIENT
Start: 2025-01-14

## 2025-01-13 RX ADMIN — SODIUM CHLORIDE 1000 MG: 900 INJECTION INTRAVENOUS at 11:23

## 2025-01-13 RX ADMIN — Medication 10 ML: at 11:17

## 2025-01-13 NOTE — PROGRESS NOTES
[facility] Progress Note  Date: January 13, 2025     Treatment: Solumedrol    Ms. Rodriguez arrived in no acute distress at 1110.    Assessment was unremarkable with no new concerns voiced. 24G PIV established in LAC with positive blood return noted.     Problem: Safety - Adult  Goal: Free from fall injury  Outcome: Progressing     Problem: Chronic Conditions and Co-morbidities  Goal: Patient's chronic conditions and co-morbidity symptoms are monitored and maintained or improved  Outcome: Progressing    Ms. Rodriguez's vitals for today's visit.   Patient Vitals for the past 12 hrs:   Temp Pulse Resp BP SpO2   01/13/25 1222 -- 71 -- (!) 109/59 --   01/13/25 1111 98.2 °F (36.8 °C) 68 16 (!) 105/59 100 %     Lab results:  No results found for this or any previous visit (from the past 12 hour(s)).    Peripheral IV 01/13/25 Left;Proximal Forearm (Active)     Medications given:  Medications Administered         methylPREDNISolone (Solu-MEDROL) 1000 mg in 100 mL NS IVPB minibag Admin Date  01/13/2025 Action  New Bag Dose  1,000 mg Rate  100 mL/hr Route  IntraVENous Documented By  Nighat Vargas RN        sodium chloride flush 0.9 % injection 5-40 mL Admin Date  01/13/2025 Action  Given Dose  10 mL Rate   Route  IntraVENous Documented By  Nighat Vargas, DHRUV          Ms. Rodriguez tolerated the treatment without complaints. PIV flushed, capped and secured for tomorrow's appt.    Ms. [facility] was [facility] in stable condition at 1225.     Future Appointments   Date Time Provider Department Center   1/14/2025  9:00 AM [facility] INFUSION NURSE 2 [facility]   1/15/2025 11:00 AM [facility] INFUSION NURSE 1 [facility]   2/27/2025  8:00 AM Irina Benedict APRN - NP NEUROWRSPPBB BS [facility]   5/8/2025  9:00 AM Irina Benedict APRN - NP NEUROWRSPPBB BS [facility]     Nighat Vargas, DHRUV  January 13, 2025  12:28 PM

## 2025-01-14 ENCOUNTER — HOSPITAL ENCOUNTER (OUTPATIENT)
Facility: HOSPITAL | Age: 30
Setting detail: INFUSION SERIES
Discharge: HOME | End: 2025-01-14
Payer: COMMERCIAL

## 2025-01-14 VITALS
RESPIRATION RATE: 16 BRPM | HEART RATE: 71 BPM | OXYGEN SATURATION: 100 % | SYSTOLIC BLOOD PRESSURE: 114 MMHG | DIASTOLIC BLOOD PRESSURE: 60 MMHG | TEMPERATURE: 98 F

## 2025-01-14 DIAGNOSIS — G35 MS (MULTIPLE SCLEROSIS) (HCC): Primary | ICD-10-CM

## 2025-01-14 PROCEDURE — 96365 THER/PROPH/DIAG IV INF INIT: CPT

## 2025-01-14 PROCEDURE — 2580000003 HC RX 258: Performed by: NURSE PRACTITIONER

## 2025-01-14 PROCEDURE — 6360000002 HC RX W HCPCS: Performed by: NURSE PRACTITIONER

## 2025-01-14 RX ORDER — SODIUM CHLORIDE 9 MG/ML
5-250 INJECTION, SOLUTION INTRAVENOUS PRN
OUTPATIENT
Start: 2025-01-15

## 2025-01-14 RX ORDER — SODIUM CHLORIDE 9 MG/ML
5-250 INJECTION, SOLUTION INTRAVENOUS PRN
Status: CANCELLED | OUTPATIENT
Start: 2025-01-15

## 2025-01-14 RX ORDER — SODIUM CHLORIDE 9 MG/ML
5-250 INJECTION, SOLUTION INTRAVENOUS PRN
Status: DISCONTINUED | OUTPATIENT
Start: 2025-01-14 | End: 2025-01-15 | Stop reason: HOSPADM

## 2025-01-14 RX ORDER — SODIUM CHLORIDE 0.9 % (FLUSH) 0.9 %
5-40 SYRINGE (ML) INJECTION PRN
Status: CANCELLED | OUTPATIENT
Start: 2025-01-15

## 2025-01-14 RX ADMIN — SODIUM CHLORIDE 1000 MG: 900 INJECTION INTRAVENOUS at 09:25

## 2025-01-14 NOTE — PROGRESS NOTES
[facility] Visit Note    Pt arrived to [facility] ambulatory in no acute distress for Solu-Medrol.  Assessment unchanged.  IV established at yesterday's appointment; flushed without issue and positive blood return noted.      The following medications administered:  Medications Administered         methylPREDNISolone (Solu-MEDROL) 1000 mg in 100 mL NS IVPB minibag Admin Date  01/14/2025 Action  New Bag Dose  1,000 mg Rate  100 mL/hr Route  IntraVENous Documented By  Zo Gamboa RN          Patient Vitals for the past 24 hrs:   BP Temp Temp src Pulse Resp SpO2   01/14/25 1030 114/60 -- -- 71 16 --   01/14/25 0915 109/66 98 °F (36.7 °C) Temporal 73 16 100 %     Pt tolerated treatment well.  IV flushed per policy and removed, 2x2 and coban placed.  Pt discharged ambulatory in no acute distress.  Next appointments:  Future Appointments   Date Time Provider Department Center   1/15/2025 11:00 AM [facility] INFUSION NURSE 1 [facility]   2/27/2025  8:00 AM Irina Benedict APRN - NP NEUROWRSPPBB BS [facility]   5/8/2025  9:00 AM Irina Benedict APRN - NP NEUROWRSPPBB BS [facility]

## 2025-01-15 ENCOUNTER — HOSPITAL ENCOUNTER (OUTPATIENT)
Facility: HOSPITAL | Age: 30
Setting detail: INFUSION SERIES
Discharge: HOME | End: 2025-01-15
Payer: COMMERCIAL

## 2025-01-15 VITALS
RESPIRATION RATE: 16 BRPM | SYSTOLIC BLOOD PRESSURE: 108 MMHG | OXYGEN SATURATION: 100 % | DIASTOLIC BLOOD PRESSURE: 69 MMHG | HEART RATE: 60 BPM | TEMPERATURE: 98.2 F

## 2025-01-15 DIAGNOSIS — G35 MS (MULTIPLE SCLEROSIS) (HCC): Primary | ICD-10-CM

## 2025-01-15 PROCEDURE — 96365 THER/PROPH/DIAG IV INF INIT: CPT

## 2025-01-15 PROCEDURE — 6360000002 HC RX W HCPCS: Performed by: NURSE PRACTITIONER

## 2025-01-15 PROCEDURE — 2580000003 HC RX 258: Performed by: NURSE PRACTITIONER

## 2025-01-15 PROCEDURE — 2500000003 HC RX 250 WO HCPCS: Performed by: NURSE PRACTITIONER

## 2025-01-15 RX ORDER — SODIUM CHLORIDE 9 MG/ML
5-250 INJECTION, SOLUTION INTRAVENOUS PRN
Status: DISCONTINUED | OUTPATIENT
Start: 2025-01-15 | End: 2025-01-16 | Stop reason: HOSPADM

## 2025-01-15 RX ORDER — SODIUM CHLORIDE 9 MG/ML
5-250 INJECTION, SOLUTION INTRAVENOUS PRN
OUTPATIENT
Start: 2025-01-16

## 2025-01-15 RX ORDER — SODIUM CHLORIDE 0.9 % (FLUSH) 0.9 %
5-40 SYRINGE (ML) INJECTION PRN
OUTPATIENT
Start: 2025-01-16

## 2025-01-15 RX ORDER — SODIUM CHLORIDE 0.9 % (FLUSH) 0.9 %
5-40 SYRINGE (ML) INJECTION PRN
Status: DISCONTINUED | OUTPATIENT
Start: 2025-01-15 | End: 2025-01-16 | Stop reason: HOSPADM

## 2025-01-15 RX ADMIN — SODIUM CHLORIDE 10 ML: 9 INJECTION, SOLUTION INTRAMUSCULAR; INTRAVENOUS; SUBCUTANEOUS at 12:25

## 2025-01-15 RX ADMIN — SODIUM CHLORIDE 1000 MG: 900 INJECTION INTRAVENOUS at 11:19

## 2025-01-15 NOTE — PROGRESS NOTES
[facility] Short Note                       Date: January 15, 2025    Name: Wu Rodriguez    MRN: [id number]         : 1995    Treatment: Solu-Medrol day 3     [facility] COVID-19 SCREENING      The patient was asked the following questions and answered as documented below:    Do you have any symptoms of COVID-19?  SOB, coughing, fever, or generally not feeling well? NO  Have you been exposed to COVID-19 recently? NO  Have you had any recent contact with family/friend that has a pending COVID test? NO      Follow Up: Proceed with treatment    Ms. Rodriguez was assessed and education was provided. No c/o pain today, VSS, I placed to the LAC w/o difficulty.     Lines:   Peripheral IV 01/15/25 Left Antecubital (Active)   Site Assessment Clean, dry & intact 01/15/25 1110   Line Status Brisk blood return 01/15/25 1110   Phlebitis Assessment No symptoms 01/15/25 1110   Infiltration Assessment 0 01/15/25 1110   Dressing Status New dressing applied 01/15/25 1110   Dressing Type Transparent 01/15/25 1110   Dressing Intervention New 01/15/25 1110        Ms. North vitals were reviewed prior to and after treatment.   Patient Vitals for the past 12 hrs:   Temp Pulse Resp BP SpO2   01/15/25 1225 -- 60 -- 108/69 --   01/15/25 1106 98.2 °F (36.8 °C) 75 16 106/60 100 %         Medications given:  Medications Administered         methylPREDNISolone (Solu-MEDROL) 1000 mg in 100 mL NS IVPB minibag Admin Date  01/15/2025 Action  New Bag Dose  1,000 mg Rate  100 mL/hr Route  IntraVENous Documented By  Carmen Mathews RN        sodium chloride flush 0.9 % injection 5-40 mL Admin Date  01/15/2025 Action  Given Dose  10 mL Rate   Route  IntraVENous Documented By  Carmen Mathews RN            Ms. Rodriguez tolerated the treatment without complaints.    Ms. Rodriguez was [facility] in stable condition at 1230.      Future Appointments   Date Time Provider Department Center   2025  8:00 AM Irina Benedict,

## 2025-01-15 NOTE — TELEPHONE ENCOUNTER
Keven requesting the order for the MRI of the Thoracic Spine with and without contrast be faxed to [phone]     Stated they have not received order.

## 2025-02-07 SDOH — ECONOMIC STABILITY: INCOME INSECURITY: IN THE LAST 12 MONTHS, WAS THERE A TIME WHEN YOU WERE NOT ABLE TO PAY THE MORTGAGE OR RENT ON TIME?: NO

## 2025-02-07 SDOH — ECONOMIC STABILITY: FOOD INSECURITY: WITHIN THE PAST 12 MONTHS, THE FOOD YOU BOUGHT JUST DIDN'T LAST AND YOU DIDN'T HAVE MONEY TO GET MORE.: NEVER TRUE

## 2025-02-07 SDOH — ECONOMIC STABILITY: FOOD INSECURITY: WITHIN THE PAST 12 MONTHS, YOU WORRIED THAT YOUR FOOD WOULD RUN OUT BEFORE YOU GOT MONEY TO BUY MORE.: NEVER TRUE

## 2025-02-07 SDOH — ECONOMIC STABILITY: TRANSPORTATION INSECURITY
IN THE PAST 12 MONTHS, HAS THE LACK OF TRANSPORTATION KEPT YOU FROM MEDICAL APPOINTMENTS OR FROM GETTING MEDICATIONS?: NO

## 2025-02-10 ENCOUNTER — OFFICE VISIT (OUTPATIENT)
Age: 30
End: 2025-02-10
Payer: COMMERCIAL

## 2025-02-10 VITALS
SYSTOLIC BLOOD PRESSURE: 95 MMHG | HEART RATE: 80 BPM | TEMPERATURE: 98.3 F | BODY MASS INDEX: 24.1 KG/M2 | WEIGHT: 136 LBS | RESPIRATION RATE: 17 BRPM | HEIGHT: 63 IN | DIASTOLIC BLOOD PRESSURE: 59 MMHG | OXYGEN SATURATION: 100 %

## 2025-02-10 DIAGNOSIS — G35 MS (MULTIPLE SCLEROSIS) (HCC): Primary | ICD-10-CM

## 2025-02-10 PROCEDURE — 99213 OFFICE O/P EST LOW 20 MIN: CPT

## 2025-02-10 ASSESSMENT — PATIENT HEALTH QUESTIONNAIRE - PHQ9
SUM OF ALL RESPONSES TO PHQ QUESTIONS 1-9: 0
SUM OF ALL RESPONSES TO PHQ QUESTIONS 1-9: 0
1. LITTLE INTEREST OR PLEASURE IN DOING THINGS: NOT AT ALL
SUM OF ALL RESPONSES TO PHQ9 QUESTIONS 1 & 2: 0
SUM OF ALL RESPONSES TO PHQ QUESTIONS 1-9: 0
2. FEELING DOWN, DEPRESSED OR HOPELESS: NOT AT ALL
SUM OF ALL RESPONSES TO PHQ QUESTIONS 1-9: 0

## 2025-02-10 NOTE — PATIENT INSTRUCTIONS
Thank you for coming to your visit. It was a pleasure taking care of you!    I have sent a referral to Dr. Cook' office in [place], [place]. Please call this number: [phone] to schedule an appointment.       Please make a follow up appointment as needed.       Please let me know if you have any questions.    Thank you,  Dr. Cooney

## 2025-02-10 NOTE — PROGRESS NOTES
Roomed by name and .    Chief Complaint   Patient presents with    Referral - General     Neurology for MS        Vitals:    02/10/25 1618   Resp: 17   Weight: 61.7 kg (136 lb)   Height: 1.6 m (5' 3\")          \"Have you been to the ER, urgent care clinic since your last visit?  Hospitalized since your last visit?\"    NO    “Have you seen or consulted any other health care providers outside of [facility] since your last visit?”    NO            Click Here for Release of Records Request

## 2025-02-11 NOTE — ASSESSMENT & PLAN NOTE
Monitored by specialist- no acute findings meriting change in the plan. Currently sees Irina Benedict NP at  Neurology but wants an MS specialist. Will refer today to physician of pt's preference (Dr. Cook in [place]). Pt describes that when she has flares (which has been more frequent recently) prednisone has worked in the past in addition to other immunologic infusions. High dose, short term glucocorticoids are indicated in acute flares and are standard initial treatment. Told patient if she feels she is having severe sx, she should seek medical care immediately.

## 2025-02-17 ENCOUNTER — HOSPITAL ENCOUNTER (OUTPATIENT)
Facility: HOSPITAL | Age: 30
Discharge: HOME | End: 2025-02-20
Payer: COMMERCIAL

## 2025-02-17 DIAGNOSIS — G35 RELAPSING REMITTING MULTIPLE SCLEROSIS (HCC): ICD-10-CM

## 2025-02-17 PROCEDURE — 72156 MRI NECK SPINE W/O & W/DYE: CPT

## 2025-02-17 PROCEDURE — 6360000004 HC RX CONTRAST MEDICATION: Performed by: PSYCHIATRY & NEUROLOGY

## 2025-02-17 PROCEDURE — A9579 GAD-BASE MR CONTRAST NOS,1ML: HCPCS | Performed by: PSYCHIATRY & NEUROLOGY

## 2025-02-17 PROCEDURE — 70553 MRI BRAIN STEM W/O & W/DYE: CPT

## 2025-02-17 PROCEDURE — 72157 MRI CHEST SPINE W/O & W/DYE: CPT

## 2025-02-17 RX ADMIN — GADOTERIDOL 12 ML: 279.3 INJECTION, SOLUTION INTRAVENOUS at 18:12

## 2025-02-27 ENCOUNTER — TELEMEDICINE (OUTPATIENT)
Age: 30
End: 2025-02-27
Payer: COMMERCIAL

## 2025-02-27 DIAGNOSIS — G35 MULTIPLE SCLEROSIS EXACERBATION (HCC): ICD-10-CM

## 2025-02-27 DIAGNOSIS — G35 RELAPSING REMITTING MULTIPLE SCLEROSIS (HCC): Primary | ICD-10-CM

## 2025-02-27 DIAGNOSIS — G35 RELAPSING REMITTING MULTIPLE SCLEROSIS (HCC): ICD-10-CM

## 2025-02-27 PROCEDURE — 99215 OFFICE O/P EST HI 40 MIN: CPT | Performed by: NURSE PRACTITIONER

## 2025-02-27 NOTE — PROGRESS NOTES
[facility] NEUROSCIENCE [facility] MEDICAL/EMERGENCY CENTER  NEUROLOGY CLINIC   [address]   [place] [address]   [phone] Office   [phone] Fax           Date:  25     Name:  WU SQUIRES  :  1995  MRN:  [id number]     PCP:  Ana Maria Cooney MD    Wu Squires is a 29 y.o. female who was seen by synchronous (real-time) audio-video technology on 2025 for Multiple Sclerosis    Subjective:   Since her last visit, she has contacted the office regarding possible exacerbations. She has had IV solumedrol for three days in January and did feel better. However, she contacted me this week regarding return of the numbness in her ear extending into the left leg and left arm, weakness, and increased fatigue. The left side was also heavy and tingly. She states that it seemed like it was starting to affect her right side as well. We are trying to get her set up for another five days of IV Solumedrol to help with this. This will be the third round of solumedrol in as many months. Most recent MRIs were stable.     Baseline, she has  some persistent tingling left leg and it will feel heavy at times as well. She did do the first year cycle of Mavenclad and finished .     MS history:  Diagnosed in . Her presenting symptoms were numbness and loss of coordination in the left side. She remembers having a severe migraine and the arm started to go numb. She was hospitalized that December and over the course of the next nine days. She was started with DMT Copaxone. Due to a relapse and progression and lack of compliance, she was started on three days a week Copaxone but still did not respond well to this. She started having left facial drop. She was switched to Gilenya and she did well for about four years. She was then switched to Kesimpta. While on Kesimpta, she has done well but due to family planning, she wanted to switch to something that would be

## 2025-03-03 ENCOUNTER — HOSPITAL ENCOUNTER (OUTPATIENT)
Facility: HOSPITAL | Age: 30
Setting detail: INFUSION SERIES
Discharge: HOME | End: 2025-03-03
Payer: COMMERCIAL

## 2025-03-03 VITALS
DIASTOLIC BLOOD PRESSURE: 69 MMHG | RESPIRATION RATE: 16 BRPM | HEART RATE: 64 BPM | TEMPERATURE: 98.7 F | OXYGEN SATURATION: 100 % | SYSTOLIC BLOOD PRESSURE: 110 MMHG

## 2025-03-03 DIAGNOSIS — G35 MS (MULTIPLE SCLEROSIS) (HCC): Primary | ICD-10-CM

## 2025-03-03 PROCEDURE — 96365 THER/PROPH/DIAG IV INF INIT: CPT

## 2025-03-03 PROCEDURE — 6360000002 HC RX W HCPCS: Performed by: NURSE PRACTITIONER

## 2025-03-03 PROCEDURE — 2580000003 HC RX 258: Performed by: NURSE PRACTITIONER

## 2025-03-03 RX ORDER — SODIUM CHLORIDE 9 MG/ML
5-250 INJECTION, SOLUTION INTRAVENOUS PRN
Status: CANCELLED | OUTPATIENT
Start: 2025-03-04

## 2025-03-03 RX ORDER — SODIUM CHLORIDE 9 MG/ML
5-250 INJECTION, SOLUTION INTRAVENOUS PRN
Status: DISCONTINUED | OUTPATIENT
Start: 2025-03-03 | End: 2025-03-04 | Stop reason: HOSPADM

## 2025-03-03 RX ORDER — SODIUM CHLORIDE 9 MG/ML
5-250 INJECTION, SOLUTION INTRAVENOUS PRN
Status: CANCELLED | OUTPATIENT
Start: 2025-03-03

## 2025-03-03 RX ORDER — SODIUM CHLORIDE 0.9 % (FLUSH) 0.9 %
5-40 SYRINGE (ML) INJECTION PRN
Status: CANCELLED | OUTPATIENT
Start: 2025-03-04

## 2025-03-03 RX ORDER — SODIUM CHLORIDE 0.9 % (FLUSH) 0.9 %
5-40 SYRINGE (ML) INJECTION PRN
Status: DISCONTINUED | OUTPATIENT
Start: 2025-03-03 | End: 2025-03-04 | Stop reason: HOSPADM

## 2025-03-03 RX ADMIN — SODIUM CHLORIDE 1000 MG: 9 INJECTION, SOLUTION INTRAVENOUS at 14:17

## 2025-03-03 NOTE — PROGRESS NOTES
[facility] Progress Note  Date: March 3, 2025     Treatment: Solumedrol    Ms. Rodriguez arrived in no acute distress at 1400.    Assessment was unremarkable with no new concerns voiced. 24G PIV established in LAC with positive blood return noted.     Problem: Safety - Adult  Goal: Free from fall injury  Outcome: Progressing    Ms. Rodriguez's vitals for today's visit.   Patient Vitals for the past 12 hrs:   Temp Pulse Resp BP SpO2   03/03/25 1536 -- 64 -- 110/69 --   03/03/25 1410 98.7 °F (37.1 °C) 69 16 111/67 100 %     Lab results:  No results found for this or any previous visit (from the past 12 hour(s)).    Peripheral IV 03/03/25 Left;Proximal Forearm (Active)   Site Assessment Clean, dry & intact 03/03/25 1510   Line Status Blood return noted;Flushed 03/03/25 1510   Line Care Ports disinfected 03/03/25 1510   Phlebitis Assessment No symptoms 03/03/25 1510   Infiltration Assessment 0 03/03/25 1510   Alcohol Cap Used Yes 03/03/25 1510   Dressing Status New dressing applied;Clean, dry & intact 03/03/25 1510   Dressing Type Transparent 03/03/25 1510   Dressing Intervention New 03/03/25 1510       Medications given:  Medications Administered         methylPREDNISolone (Solu-MEDROL) 1000 mg in 100 mL NS IVPB (addEASE) Admin Date  03/03/2025 Action  New Bag Dose  1,000 mg Rate  100 mL/hr Route  IntraVENous Documented By  Nighat Vargas, RN          Ms. Rodriguez tolerated the treatment without complaints. PIV flushed and removed, 2x2 and coban placed.    Ms. Rodriguez was discharged from [facility] in stable condition at 1540.     Future Appointments   Date Time Provider Department Center   3/5/2025  9:00 AM [facility] INFUSION NURSE 2 [facility]   3/6/2025  9:00 AM [facility] INFUSION NURSE 2 [facility]   3/7/2025  9:00 AM [facility] INFUSION NURSE 2 [facility]   3/10/2025  8:00 AM [facility] INFUSION NURSE 2 [facility]   5/8/2025  9:00 AM Irina Benedict APRN - NP NEUROWRSPPBB BS PATRICIA Vargas RN  March 3, 2025  3:45  PM

## 2025-03-04 ENCOUNTER — APPOINTMENT (OUTPATIENT)
Facility: HOSPITAL | Age: 30
End: 2025-03-04
Payer: COMMERCIAL

## 2025-03-05 ENCOUNTER — HOSPITAL ENCOUNTER (OUTPATIENT)
Facility: HOSPITAL | Age: 30
Setting detail: INFUSION SERIES
Discharge: HOME | End: 2025-03-05
Payer: COMMERCIAL

## 2025-03-05 VITALS
RESPIRATION RATE: 16 BRPM | DIASTOLIC BLOOD PRESSURE: 69 MMHG | SYSTOLIC BLOOD PRESSURE: 115 MMHG | HEART RATE: 68 BPM | TEMPERATURE: 98.1 F

## 2025-03-05 DIAGNOSIS — G35 MS (MULTIPLE SCLEROSIS) (HCC): Primary | ICD-10-CM

## 2025-03-05 PROCEDURE — 96365 THER/PROPH/DIAG IV INF INIT: CPT

## 2025-03-05 PROCEDURE — 6360000002 HC RX W HCPCS: Performed by: NURSE PRACTITIONER

## 2025-03-05 PROCEDURE — 2580000003 HC RX 258: Performed by: NURSE PRACTITIONER

## 2025-03-05 PROCEDURE — 2500000003 HC RX 250 WO HCPCS: Performed by: NURSE PRACTITIONER

## 2025-03-05 RX ORDER — SODIUM CHLORIDE 9 MG/ML
5-250 INJECTION, SOLUTION INTRAVENOUS PRN
Status: CANCELLED | OUTPATIENT
Start: 2025-03-06

## 2025-03-05 RX ORDER — SODIUM CHLORIDE 9 MG/ML
5-250 INJECTION, SOLUTION INTRAVENOUS PRN
Status: DISCONTINUED | OUTPATIENT
Start: 2025-03-05 | End: 2025-03-06 | Stop reason: HOSPADM

## 2025-03-05 RX ORDER — SODIUM CHLORIDE 0.9 % (FLUSH) 0.9 %
5-40 SYRINGE (ML) INJECTION PRN
Status: DISCONTINUED | OUTPATIENT
Start: 2025-03-05 | End: 2025-03-06 | Stop reason: HOSPADM

## 2025-03-05 RX ORDER — SODIUM CHLORIDE 0.9 % (FLUSH) 0.9 %
5-40 SYRINGE (ML) INJECTION PRN
Status: CANCELLED | OUTPATIENT
Start: 2025-03-06

## 2025-03-05 RX ADMIN — Medication 10 ML: at 09:19

## 2025-03-05 RX ADMIN — SODIUM CHLORIDE 1000 MG: 9 INJECTION, SOLUTION INTRAVENOUS at 09:31

## 2025-03-05 RX ADMIN — Medication 10 ML: at 11:02

## 2025-03-05 RX ADMIN — SODIUM CHLORIDE 250 ML/HR: 9 INJECTION, SOLUTION INTRAVENOUS at 09:31

## 2025-03-05 NOTE — PROGRESS NOTES
[facility] Progress Note  Date: March 5, 2025     Treatment: /solumedrol    Ms. Rodriguez arrived in no acute distress at 0910.    Assessment was unremarkable with no new concerns voiced. 24G PIV established in RAC with positive blood return noted.     Problem: Safety - Adult  Goal: Free from fall injury  Outcome: Progressing     Problem: Chronic Conditions and Co-morbidities  Goal: Patient's chronic conditions and co-morbidity symptoms are monitored and maintained or improved  Outcome: Progressing    Ms. Rodriguez's vitals for today's visit.   Patient Vitals for the past 12 hrs:   Temp Pulse Resp BP   03/05/25 1052 -- 68 -- 115/69   03/05/25 0913 98.1 °F (36.7 °C) 82 16 108/62     Peripheral IV 03/05/25 Proximal;Right Forearm (Active)     Medications given:  Medications Administered         0.9 % sodium chloride infusion Admin Date  03/05/2025 Action  New Bag Dose  250 mL/hr Rate  250 mL/hr Route  IntraVENous Documented By  Nighat Vargas, DHRUV        methylPREDNISolone (Solu-MEDROL) 1000 mg in 100 mL NS IVPB (addEASE) Admin Date  03/05/2025 Action  New Bag Dose  1,000 mg Rate  100 mL/hr Route  IntraVENous Documented By  Nighat Vargas, RN        sodium chloride flush 0.9 % injection 5-40 mL Admin Date  03/05/2025 Action  Given Dose  10 mL Rate   Route  IntraVENous Documented By  Nighat Vargas, RN        sodium chloride flush 0.9 % injection 5-40 mL Admin Date  03/05/2025 Action  Given Dose  10 mL Rate   Route  IntraVENous Documented By  Nighat Vargas, RN          Ms. Rodriguez tolerated the treatment without complaints. PIV flushed and removed, 2x2 and coban placed.    Ms. Rodriguez was discharged from [facility] in stable condition at 1105.     Future Appointments   Date Time Provider Department Center   3/6/2025  9:00 AM [facility] INFUSION NURSE 2 [facility]   3/7/2025  9:00 AM [facility] INFUSION NURSE 2 [facility]   3/10/2025  8:00 AM [facility] INFUSION NURSE 2 [facility]   5/8/2025  9:00 AM Irina Benedict,  APRN - NP NEUROWRSPPBB BS PATRICIA Vargas RN  March 5, 2025  11:09 AM

## 2025-03-06 ENCOUNTER — HOSPITAL ENCOUNTER (OUTPATIENT)
Facility: HOSPITAL | Age: 30
Setting detail: INFUSION SERIES
Discharge: HOME | End: 2025-03-06
Payer: COMMERCIAL

## 2025-03-06 VITALS
OXYGEN SATURATION: 100 % | HEART RATE: 62 BPM | RESPIRATION RATE: 16 BRPM | DIASTOLIC BLOOD PRESSURE: 81 MMHG | TEMPERATURE: 99.8 F | SYSTOLIC BLOOD PRESSURE: 113 MMHG

## 2025-03-06 DIAGNOSIS — G35 MS (MULTIPLE SCLEROSIS) (HCC): Primary | ICD-10-CM

## 2025-03-06 PROCEDURE — 2580000003 HC RX 258: Performed by: NURSE PRACTITIONER

## 2025-03-06 PROCEDURE — 6360000002 HC RX W HCPCS: Performed by: NURSE PRACTITIONER

## 2025-03-06 PROCEDURE — 2500000003 HC RX 250 WO HCPCS: Performed by: NURSE PRACTITIONER

## 2025-03-06 PROCEDURE — 96365 THER/PROPH/DIAG IV INF INIT: CPT

## 2025-03-06 RX ORDER — SODIUM CHLORIDE 9 MG/ML
5-250 INJECTION, SOLUTION INTRAVENOUS PRN
Status: DISCONTINUED | OUTPATIENT
Start: 2025-03-06 | End: 2025-03-07 | Stop reason: HOSPADM

## 2025-03-06 RX ORDER — SODIUM CHLORIDE 9 MG/ML
5-250 INJECTION, SOLUTION INTRAVENOUS PRN
Status: CANCELLED | OUTPATIENT
Start: 2025-03-07

## 2025-03-06 RX ORDER — SODIUM CHLORIDE 0.9 % (FLUSH) 0.9 %
5-40 SYRINGE (ML) INJECTION PRN
Status: CANCELLED | OUTPATIENT
Start: 2025-03-07

## 2025-03-06 RX ORDER — SODIUM CHLORIDE 0.9 % (FLUSH) 0.9 %
5-40 SYRINGE (ML) INJECTION PRN
Status: DISCONTINUED | OUTPATIENT
Start: 2025-03-06 | End: 2025-03-07 | Stop reason: HOSPADM

## 2025-03-06 RX ORDER — SODIUM CHLORIDE 9 MG/ML
5-250 INJECTION, SOLUTION INTRAVENOUS PRN
OUTPATIENT
Start: 2025-03-07

## 2025-03-06 RX ADMIN — SODIUM CHLORIDE 250 ML/HR: 9 INJECTION, SOLUTION INTRAVENOUS at 09:20

## 2025-03-06 RX ADMIN — Medication 10 ML: at 10:48

## 2025-03-06 RX ADMIN — Medication 10 ML: at 09:19

## 2025-03-06 RX ADMIN — SODIUM CHLORIDE 1000 MG: 9 INJECTION, SOLUTION INTRAVENOUS at 09:25

## 2025-03-06 NOTE — PROGRESS NOTES
[facility] Progress Note  Date: March 6, 2025     Treatment: Solumedrol 3/5    Ms. Rodriguez arrived in no acute distress at 0910.    Assessment was unremarkable with no new concerns voiced. 24G PIV established in LAC with positive blood return noted.     Problem: Chronic Conditions and Co-morbidities  Goal: Patient's chronic conditions and co-morbidity symptoms are monitored and maintained or improved  Outcome: Progressing     Problem: Safety - Adult  Goal: Free from fall injury  Outcome: Progressing    Ms. Rodriguez's vitals for today's visit.   Patient Vitals for the past 12 hrs:   Temp Pulse Resp BP SpO2   03/06/25 1035 -- 62 -- 113/81 --   03/06/25 0914 99.8 °F (37.7 °C) 79 16 110/75 100 %     Lab results:  No results found for this or any previous visit (from the past 12 hour(s)).    Peripheral IV 03/06/25 Left;Proximal Forearm (Active)     Medications given:  Medications Administered         0.9 % sodium chloride infusion Admin Date  03/06/2025 Action  New Bag Dose  250 mL/hr Rate  250 mL/hr Route  IntraVENous Documented By  Nighat Vargas RN        methylPREDNISolone (Solu-MEDROL) 1000 mg in 100 mL NS IVPB (addEASE) Admin Date  03/06/2025 Action  New Bag Dose  1,000 mg Rate  100 mL/hr Route  IntraVENous Documented By  Nighat Vargas, RN        sodium chloride flush 0.9 % injection 5-40 mL Admin Date  03/06/2025 Action  Given Dose  10 mL Rate   Route  IntraVENous Documented By  Nighat Vargas, RN        sodium chloride flush 0.9 % injection 5-40 mL Admin Date  03/06/2025 Action  Given Dose  10 mL Rate   Route  IntraVENous Documented By  Nighat Vargas, RN          Ms. Rodriguez tolerated the treatment without complaints. PIV flushed and removed, 2x2 and coban placed.    [facility] condition at 1050.     Future Appointments   Date Time Provider Department Center   3/7/2025  9:00 AM [facility] INFUSION NURSE 2 [facility]   3/10/2025  8:00 AM [facility]

## 2025-03-07 ENCOUNTER — HOSPITAL ENCOUNTER (OUTPATIENT)
Facility: HOSPITAL | Age: 30
Setting detail: INFUSION SERIES
End: 2025-03-07

## 2025-03-10 ENCOUNTER — HOSPITAL ENCOUNTER (OUTPATIENT)
Facility: HOSPITAL | Age: 30
Setting detail: INFUSION SERIES
Discharge: HOME | End: 2025-03-10
Payer: COMMERCIAL

## 2025-03-10 VITALS
OXYGEN SATURATION: 100 % | HEART RATE: 70 BPM | SYSTOLIC BLOOD PRESSURE: 110 MMHG | RESPIRATION RATE: 16 BRPM | TEMPERATURE: 97.4 F | DIASTOLIC BLOOD PRESSURE: 60 MMHG

## 2025-03-10 DIAGNOSIS — G35 MS (MULTIPLE SCLEROSIS) (HCC): Primary | ICD-10-CM

## 2025-03-10 PROCEDURE — 2580000003 HC RX 258: Performed by: NURSE PRACTITIONER

## 2025-03-10 PROCEDURE — 6360000002 HC RX W HCPCS: Performed by: NURSE PRACTITIONER

## 2025-03-10 PROCEDURE — 2500000003 HC RX 250 WO HCPCS: Performed by: NURSE PRACTITIONER

## 2025-03-10 PROCEDURE — 96365 THER/PROPH/DIAG IV INF INIT: CPT

## 2025-03-10 RX ORDER — SODIUM CHLORIDE 0.9 % (FLUSH) 0.9 %
5-40 SYRINGE (ML) INJECTION PRN
Status: DISCONTINUED | OUTPATIENT
Start: 2025-03-10 | End: 2025-03-11 | Stop reason: HOSPADM

## 2025-03-10 RX ORDER — SODIUM CHLORIDE 0.9 % (FLUSH) 0.9 %
5-40 SYRINGE (ML) INJECTION PRN
OUTPATIENT
Start: 2025-03-11

## 2025-03-10 RX ORDER — SODIUM CHLORIDE 9 MG/ML
5-250 INJECTION, SOLUTION INTRAVENOUS PRN
OUTPATIENT
Start: 2025-03-11

## 2025-03-10 RX ADMIN — SODIUM CHLORIDE 1000 MG: 9 INJECTION, SOLUTION INTRAVENOUS at 08:34

## 2025-03-10 RX ADMIN — SODIUM CHLORIDE 10 ML: 9 INJECTION, SOLUTION INTRAMUSCULAR; INTRAVENOUS; SUBCUTANEOUS at 08:30

## 2025-03-10 NOTE — PROGRESS NOTES
[facility] Short Note                       Date: March 10, 2025    Name: Wu Rodriguez    MRN: [id number]         : 1995    Treatment: Solu-Medrol day 5     [facility] COVID-19 SCREENING      The patient was asked the following questions and answered as documented below:    Do you have any symptoms of COVID-19?  SOB, coughing, fever, or generally not feeling well? NO  Have you been exposed to COVID-19 recently? NO  Have you had any recent contact with family/friend that has a pending COVID test? NO      Follow Up: Proceed with treatment    Ms. Rodriguez was assessed and education was provided.     Lines:   Peripheral IV 03/10/25 Right Antecubital (Active)   Site Assessment Clean, dry & intact 03/10/25 08   Line Status Brisk blood return 03/10/25 0830   Phlebitis Assessment No symptoms 03/10/25 0830   Infiltration Assessment 0 03/10/25 0830   Dressing Status New dressing applied 03/10/25 0830   Dressing Type Transparent 03/10/25 0830   Dressing Intervention New 03/10/25 0830        Ms. Zuluagas vitals were reviewed prior to and after treatment.   Patient Vitals for the past 12 hrs:   Temp Pulse Resp BP SpO2   03/10/25 0958 -- 70 -- 110/60 --   03/10/25 0821 97.4 °F (36.3 °C) 95 16 106/75 100 %         Medications given:  Medications Administered         methylPREDNISolone (Solu-MEDROL) 1000 mg in 100 mL NS IVPB (addEASE) Admin Date  03/10/2025 Action  New Bag Dose  1,000 mg Rate  100 mL/hr Route  IntraVENous Documented By  Carmen Mathews RN        sodium chloride flush 0.9 % injection 5-40 mL Admin Date  03/10/2025 Action  Given Dose  10 mL Rate   Route  IntraVENous Documented By  Carmen Mathews, RN            Ms. Rodriguez tolerated the treatment without complaints. Vss post treatment, IV flushed and removed.    [facility] in stable condition at 1000.      Future Appointments   Date Time Provider Department Center   2025  9:00 AM Irina Benedict APRN - [facility]

## 2025-03-12 LAB
BASOPHILS # BLD AUTO: 0 X10E3/UL (ref 0–0.2)
BASOPHILS NFR BLD AUTO: 0 %
EOSINOPHIL # BLD AUTO: 0 X10E3/UL (ref 0–0.4)
EOSINOPHIL NFR BLD AUTO: 1 %
ERYTHROCYTE [DISTWIDTH] IN BLOOD BY AUTOMATED COUNT: 12.7 % (ref 11.7–15.4)
HCT VFR BLD AUTO: 39.6 % (ref 34–46.6)
HGB BLD-MCNC: 13.4 G/DL (ref 11.1–15.9)
IMM GRANULOCYTES # BLD AUTO: 0.1 X10E3/UL (ref 0–0.1)
IMM GRANULOCYTES NFR BLD AUTO: 1 %
LYMPHOCYTES # BLD AUTO: 2.3 X10E3/UL (ref 0.7–3.1)
LYMPHOCYTES NFR BLD AUTO: 34 %
MCH RBC QN AUTO: 30.9 PG (ref 26.6–33)
MCHC RBC AUTO-ENTMCNC: 33.8 G/DL (ref 31.5–35.7)
MCV RBC AUTO: 92 FL (ref 79–97)
MONOCYTES # BLD AUTO: 0.8 X10E3/UL (ref 0.1–0.9)
MONOCYTES NFR BLD AUTO: 12 %
NEUTROPHILS # BLD AUTO: 3.5 X10E3/UL (ref 1.4–7)
NEUTROPHILS NFR BLD AUTO: 52 %
PLATELET # BLD AUTO: 191 X10E3/UL (ref 150–450)
RBC # BLD AUTO: 4.33 X10E6/UL (ref 3.77–5.28)
WBC # BLD AUTO: 6.6 X10E3/UL (ref 3.4–10.8)

## 2025-05-08 ENCOUNTER — OFFICE VISIT (OUTPATIENT)
Age: 30
End: 2025-05-08
Payer: COMMERCIAL

## 2025-05-08 VITALS
SYSTOLIC BLOOD PRESSURE: 116 MMHG | DIASTOLIC BLOOD PRESSURE: 70 MMHG | HEART RATE: 78 BPM | RESPIRATION RATE: 20 BRPM | OXYGEN SATURATION: 98 %

## 2025-05-08 DIAGNOSIS — G35 RELAPSING REMITTING MULTIPLE SCLEROSIS (HCC): Primary | ICD-10-CM

## 2025-05-08 PROCEDURE — 99213 OFFICE O/P EST LOW 20 MIN: CPT | Performed by: NURSE PRACTITIONER

## 2025-05-08 NOTE — PROGRESS NOTES
[facility] NEUROSCIENCE [facility]ICAL[facility]  NEUROLOGY CLINIC   [address]   [place] [address]   [phone] Office   [phone] Fax           Date:  25     Name:  ISMAEL SQUIRES  :  1995  MRN:  [id number]     PCP:  Ana Maria Cooney MD    Chief Complaint   Patient presents with    Follow-up     MS      HISTORY OF PRESENT ILLNESS: Follow up for RRMS. She has finished the first year cycle of Mavenclad and will be due for year two in September. Since she had had three exacerbations in as many months, we did discuss if the Mavenclad is the right choice but since her last visit, she has been doing well. She would like to continue with the Mavenclad at this point.       Recap from [facility]:  RRMS. Finished the first year cycle of Mavenclad at the beginning of September. Will repeat CBC now at the six month jonathan. New baseline MRIs are stable. While she has only finished the first course of Mavenclad, I do have some concerns about the efficacy of the Mavenclad given three exacerbations in as many months and we did discuss this today. As she wanted to pursue a therapy that would be safe for pregnancy in a couple of years, this certainly would fit that bill but in my mind is not providing any benefit at this point. We discussed using one of the interferons (Rebif or Betaseron, Betaseron more specifically) which do have pregnancy data and would be easily discontinued in the event of pregnancy if it was felt necessary to do this. Based on review of recent literature, the safest option would be Copaxone and exposure to Betaseron was associated with lower mean birth weight but only slightly lower than the mean, shorter mean birth length, and  birth (<37 weeks) but not for birth defect or spontaneous .  Based on an article published in Neurology Clinical Practice, 2024, the only safe options here would be Copaxone or Betaseron with

## 2025-05-08 NOTE — PROGRESS NOTES
MS- has been up and down   Seh was stable about 2 months after taking medication  Had a pain, tingling in her left arm   She had steroids for 3 days   She was good for about a month, then another 3 days of steroids   She finally had 5 days ordered and I think she only had 4 days of steroids she couldn't tolerate another day of steroids   Since then has been good since then which was March 10 th     IF she doesn't sleep well and her left side gets heavy she knows she needs to rest, and doesn't do anything active so she just stays home

## 2025-07-11 ENCOUNTER — PATIENT MESSAGE (OUTPATIENT)
Age: 30
End: 2025-07-11

## 2025-07-11 DIAGNOSIS — R79.89 LOW SERUM THYROID STIMULATING HORMONE (TSH): ICD-10-CM

## 2025-07-11 DIAGNOSIS — G35 RELAPSING REMITTING MULTIPLE SCLEROSIS (HCC): Primary | ICD-10-CM

## 2025-07-15 DIAGNOSIS — R79.89 LOW SERUM THYROID STIMULATING HORMONE (TSH): ICD-10-CM

## 2025-07-15 DIAGNOSIS — G35 RELAPSING REMITTING MULTIPLE SCLEROSIS (HCC): ICD-10-CM

## 2025-07-25 LAB
BASOPHILS # BLD AUTO: 0 X10E3/UL (ref 0–0.2)
BASOPHILS NFR BLD AUTO: 0 %
EOSINOPHIL # BLD AUTO: 0.1 X10E3/UL (ref 0–0.4)
EOSINOPHIL NFR BLD AUTO: 1 %
ERYTHROCYTE [DISTWIDTH] IN BLOOD BY AUTOMATED COUNT: 12.7 % (ref 11.7–15.4)
HCT VFR BLD AUTO: 43.5 % (ref 34–46.6)
HGB BLD-MCNC: 14 G/DL (ref 11.1–15.9)
IMM GRANULOCYTES # BLD AUTO: 0 X10E3/UL (ref 0–0.1)
IMM GRANULOCYTES NFR BLD AUTO: 0 %
LYMPHOCYTES # BLD AUTO: 1.4 X10E3/UL (ref 0.7–3.1)
LYMPHOCYTES NFR BLD AUTO: 27 %
MCH RBC QN AUTO: 29.9 PG (ref 26.6–33)
MCHC RBC AUTO-ENTMCNC: 32.2 G/DL (ref 31.5–35.7)
MCV RBC AUTO: 93 FL (ref 79–97)
MONOCYTES # BLD AUTO: 0.4 X10E3/UL (ref 0.1–0.9)
MONOCYTES NFR BLD AUTO: 8 %
NEUTROPHILS # BLD AUTO: 3.2 X10E3/UL (ref 1.4–7)
NEUTROPHILS NFR BLD AUTO: 64 %
PLATELET # BLD AUTO: 218 X10E3/UL (ref 150–450)
RBC # BLD AUTO: 4.68 X10E6/UL (ref 3.77–5.28)
WBC # BLD AUTO: 5.1 X10E3/UL (ref 3.4–10.8)

## 2025-07-26 LAB
ALBUMIN SERPL-MCNC: 4.6 G/DL (ref 4–5)
ALP SERPL-CCNC: 69 IU/L (ref 44–121)
ALT SERPL-CCNC: 11 IU/L (ref 0–32)
AST SERPL-CCNC: 16 IU/L (ref 0–40)
BILIRUB SERPL-MCNC: 0.6 MG/DL (ref 0–1.2)
BUN SERPL-MCNC: 14 MG/DL (ref 6–20)
BUN/CREAT SERPL: 18 (ref 9–23)
CALCIUM SERPL-MCNC: 9.4 MG/DL (ref 8.7–10.2)
CHLORIDE SERPL-SCNC: 102 MMOL/L (ref 96–106)
CO2 SERPL-SCNC: 21 MMOL/L (ref 20–29)
CREAT SERPL-MCNC: 0.79 MG/DL (ref 0.57–1)
EGFRCR SERPLBLD CKD-EPI 2021: 104 ML/MIN/1.73
GLOBULIN SER CALC-MCNC: 2.4 G/DL (ref 1.5–4.5)
GLUCOSE SERPL-MCNC: 92 MG/DL (ref 70–99)
HBV SURFACE AB SER QL: NON REACTIVE
HCV IGG SERPL QL IA: NON REACTIVE
HIV 1+2 AB+HIV1 P24 AG SERPL QL IA: NON REACTIVE
POTASSIUM SERPL-SCNC: 4.3 MMOL/L (ref 3.5–5.2)
PROT SERPL-MCNC: 7 G/DL (ref 6–8.5)
SODIUM SERPL-SCNC: 136 MMOL/L (ref 134–144)
T4 FREE SERPL-MCNC: 1.17 NG/DL (ref 0.82–1.77)
TSH SERPL DL<=0.005 MIU/L-ACNC: 1.02 UIU/ML (ref 0.45–4.5)
VZV IGG SER QL IA: REACTIVE

## 2025-07-28 LAB
IGA SERPL-MCNC: 104 MG/DL (ref 87–352)
IGE SERPL-ACNC: 2 IU/ML (ref 6–495)
IGG SERPL-MCNC: 932 MG/DL (ref 586–1602)
IGM SERPL-MCNC: 84 MG/DL (ref 26–217)

## 2025-08-07 ENCOUNTER — TELEMEDICINE (OUTPATIENT)
Age: 30
End: 2025-08-07
Payer: COMMERCIAL

## 2025-08-07 DIAGNOSIS — G35 MULTIPLE SCLEROSIS (HCC): ICD-10-CM

## 2025-08-07 PROCEDURE — 99213 OFFICE O/P EST LOW 20 MIN: CPT | Performed by: NURSE PRACTITIONER

## 2025-08-18 ENCOUNTER — TELEPHONE (OUTPATIENT)
Age: 30
End: 2025-08-18